# Patient Record
Sex: MALE | Race: WHITE | Employment: FULL TIME | URBAN - METROPOLITAN AREA
[De-identification: names, ages, dates, MRNs, and addresses within clinical notes are randomized per-mention and may not be internally consistent; named-entity substitution may affect disease eponyms.]

---

## 2018-08-13 ENCOUNTER — HOSPITAL ENCOUNTER (OUTPATIENT)
Dept: CT IMAGING | Age: 43
Discharge: HOME | End: 2018-08-13
Attending: ORTHOPAEDIC SURGERY
Payer: OTHER MISCELLANEOUS

## 2018-08-13 DIAGNOSIS — S82.142A FRACTURE OF LEFT TIBIAL PLATEAU: ICD-10-CM

## 2018-08-13 PROCEDURE — 73700 CT LOWER EXTREMITY W/O DYE: CPT

## 2018-08-14 NOTE — H&P
Outpatient Surgery History and Physical      Viviane Markham was seen and examined. Chief Complaint:   LEFT KNEE PAIN     Physical Exam:   There were no vitals taken for this visit. Heart:   Regular rhythm      Lungs:  Are clear      History:  No past medical history on file. No past surgical history on file. No family history on file. Social History     Occupational History    Not on file. Social History Main Topics    Smoking status: Not on file    Smokeless tobacco: Not on file    Alcohol use Not on file    Drug use: Not on file    Sexual activity: Not on file       Allergies: Reviewed per EMR  Allergies not on file    Medications:    Prior to Admission medications    Not on File        The surgery is planned for OPEN REDUCTION INTERNAL FIXATION OF LEFT TIBIAL PLATEAU          The patient is here today for outpatient surgery. I have examined the patient, no changes are noted in the patient's medical status. Necessity for the procedure/care is still present and the history and physical above is current.       Signed By: Arnulfo Ellis NP     August 14, 2018 3:26 PM

## 2018-08-15 ENCOUNTER — ANESTHESIA EVENT (OUTPATIENT)
Dept: SURGERY | Age: 43
End: 2018-08-15
Payer: OTHER MISCELLANEOUS

## 2018-08-15 ENCOUNTER — HOSPITAL ENCOUNTER (OUTPATIENT)
Age: 43
Setting detail: OBSERVATION
Discharge: HOME | End: 2018-08-16
Attending: ORTHOPAEDIC SURGERY | Admitting: ORTHOPAEDIC SURGERY
Payer: OTHER MISCELLANEOUS

## 2018-08-15 ENCOUNTER — APPOINTMENT (OUTPATIENT)
Dept: GENERAL RADIOLOGY | Age: 43
End: 2018-08-15
Attending: ORTHOPAEDIC SURGERY
Payer: OTHER MISCELLANEOUS

## 2018-08-15 ENCOUNTER — ANESTHESIA (OUTPATIENT)
Dept: SURGERY | Age: 43
End: 2018-08-15
Payer: OTHER MISCELLANEOUS

## 2018-08-15 DIAGNOSIS — S82.142A CLOSED FRACTURE OF LEFT TIBIAL PLATEAU, INITIAL ENCOUNTER: Primary | ICD-10-CM

## 2018-08-15 LAB — HGB BLD-MCNC: 15.1 G/DL (ref 13.6–17.2)

## 2018-08-15 PROCEDURE — C1713 ANCHOR/SCREW BN/BN,TIS/BN: HCPCS | Performed by: ORTHOPAEDIC SURGERY

## 2018-08-15 PROCEDURE — 99218 HC RM OBSERVATION: CPT

## 2018-08-15 PROCEDURE — 76010010054 HC POST OP PAIN BLOCK: Performed by: ORTHOPAEDIC SURGERY

## 2018-08-15 PROCEDURE — 76942 ECHO GUIDE FOR BIOPSY: CPT | Performed by: ORTHOPAEDIC SURGERY

## 2018-08-15 PROCEDURE — 74011250636 HC RX REV CODE- 250/636: Performed by: ANESTHESIOLOGY

## 2018-08-15 PROCEDURE — 77030029637: Performed by: ORTHOPAEDIC SURGERY

## 2018-08-15 PROCEDURE — 77030020255 HC SOL INJ LR 1000ML BG

## 2018-08-15 PROCEDURE — 77030016474 HC BIT DRL QC3 SYNT -C: Performed by: ORTHOPAEDIC SURGERY

## 2018-08-15 PROCEDURE — 76060000034 HC ANESTHESIA 1.5 TO 2 HR: Performed by: ORTHOPAEDIC SURGERY

## 2018-08-15 PROCEDURE — 77030020143 HC AIRWY LARYN INTUB CGAS -A: Performed by: ANESTHESIOLOGY

## 2018-08-15 PROCEDURE — 77030018836 HC SOL IRR NACL ICUM -A: Performed by: ORTHOPAEDIC SURGERY

## 2018-08-15 PROCEDURE — 77030032490 HC SLV COMPR SCD KNE COVD -B

## 2018-08-15 PROCEDURE — 74011250637 HC RX REV CODE- 250/637: Performed by: NURSE PRACTITIONER

## 2018-08-15 PROCEDURE — 74011250636 HC RX REV CODE- 250/636

## 2018-08-15 PROCEDURE — 85018 HEMOGLOBIN: CPT

## 2018-08-15 PROCEDURE — 76210000006 HC OR PH I REC 0.5 TO 1 HR: Performed by: ORTHOPAEDIC SURGERY

## 2018-08-15 PROCEDURE — 77030003051 HC NDL BN FIL DEL SYNT -B: Performed by: ORTHOPAEDIC SURGERY

## 2018-08-15 PROCEDURE — 74011250636 HC RX REV CODE- 250/636: Performed by: NURSE PRACTITIONER

## 2018-08-15 PROCEDURE — 77030003862 HC BIT DRL SYNT -B: Performed by: ORTHOPAEDIC SURGERY

## 2018-08-15 PROCEDURE — 74011000258 HC RX REV CODE- 258: Performed by: NURSE PRACTITIONER

## 2018-08-15 PROCEDURE — 77030003602 HC NDL NRV BLK BBMI -B: Performed by: ANESTHESIOLOGY

## 2018-08-15 PROCEDURE — 76010000162 HC OR TIME 1.5 TO 2 HR INTENSV-TIER 1: Performed by: ORTHOPAEDIC SURGERY

## 2018-08-15 PROCEDURE — 77030020782 HC GWN BAIR PAWS FLX 3M -B: Performed by: ANESTHESIOLOGY

## 2018-08-15 PROCEDURE — 77030021122 HC SPLNT MAT FST BSNM -A: Performed by: ORTHOPAEDIC SURGERY

## 2018-08-15 PROCEDURE — 77030002933 HC SUT MCRYL J&J -A: Performed by: ORTHOPAEDIC SURGERY

## 2018-08-15 PROCEDURE — 77030020753 HC CUF TRNQT 1BLA STRY -B: Performed by: ORTHOPAEDIC SURGERY

## 2018-08-15 PROCEDURE — 73590 X-RAY EXAM OF LOWER LEG: CPT

## 2018-08-15 DEVICE — SCREW BNE L50MM DIA3.5MM CORT S STL ST LOK FULL THRD: Type: IMPLANTABLE DEVICE | Site: TIBIA | Status: FUNCTIONAL

## 2018-08-15 DEVICE — IMPLANTABLE DEVICE: Type: IMPLANTABLE DEVICE | Site: TIBIA | Status: FUNCTIONAL

## 2018-08-15 DEVICE — SCREW BNE L75MM DIA3.5MM CORT S STL ST LOK FULL THRD T15: Type: IMPLANTABLE DEVICE | Site: TIBIA | Status: FUNCTIONAL

## 2018-08-15 DEVICE — SCREW BNE L36MM DIA3.5MM CORT S STL ST LOK FULL THRD: Type: IMPLANTABLE DEVICE | Site: TIBIA | Status: FUNCTIONAL

## 2018-08-15 DEVICE — SCREW BNE L28MM DIA3.5MM CORT S STL ST LOK FULL THRD: Type: IMPLANTABLE DEVICE | Site: TIBIA | Status: FUNCTIONAL

## 2018-08-15 RX ORDER — FENTANYL CITRATE 50 UG/ML
100 INJECTION, SOLUTION INTRAMUSCULAR; INTRAVENOUS ONCE
Status: COMPLETED | OUTPATIENT
Start: 2018-08-15 | End: 2018-08-15

## 2018-08-15 RX ORDER — ACETAMINOPHEN 10 MG/ML
INJECTION, SOLUTION INTRAVENOUS AS NEEDED
Status: DISCONTINUED | OUTPATIENT
Start: 2018-08-15 | End: 2018-08-15 | Stop reason: HOSPADM

## 2018-08-15 RX ORDER — OXYCODONE HYDROCHLORIDE 5 MG/1
5 TABLET ORAL
Status: DISCONTINUED | OUTPATIENT
Start: 2018-08-15 | End: 2018-08-15 | Stop reason: HOSPADM

## 2018-08-15 RX ORDER — ONDANSETRON 2 MG/ML
INJECTION INTRAMUSCULAR; INTRAVENOUS AS NEEDED
Status: DISCONTINUED | OUTPATIENT
Start: 2018-08-15 | End: 2018-08-15 | Stop reason: HOSPADM

## 2018-08-15 RX ORDER — MIDAZOLAM HYDROCHLORIDE 1 MG/ML
2 INJECTION, SOLUTION INTRAMUSCULAR; INTRAVENOUS ONCE
Status: COMPLETED | OUTPATIENT
Start: 2018-08-15 | End: 2018-08-15

## 2018-08-15 RX ORDER — DEXAMETHASONE SODIUM PHOSPHATE 4 MG/ML
INJECTION, SOLUTION INTRA-ARTICULAR; INTRALESIONAL; INTRAMUSCULAR; INTRAVENOUS; SOFT TISSUE AS NEEDED
Status: DISCONTINUED | OUTPATIENT
Start: 2018-08-15 | End: 2018-08-15 | Stop reason: HOSPADM

## 2018-08-15 RX ORDER — ACETAMINOPHEN 325 MG/1
650 TABLET ORAL EVERY 8 HOURS
Status: DISCONTINUED | OUTPATIENT
Start: 2018-08-15 | End: 2018-08-16 | Stop reason: HOSPADM

## 2018-08-15 RX ORDER — TRAMADOL HYDROCHLORIDE 50 MG/1
50 TABLET ORAL
Status: DISCONTINUED | OUTPATIENT
Start: 2018-08-15 | End: 2018-08-16 | Stop reason: HOSPADM

## 2018-08-15 RX ORDER — MAG HYDROX/ALUMINUM HYD/SIMETH 200-200-20
30 SUSPENSION, ORAL (FINAL DOSE FORM) ORAL
Status: DISCONTINUED | OUTPATIENT
Start: 2018-08-15 | End: 2018-08-16 | Stop reason: HOSPADM

## 2018-08-15 RX ORDER — HYDROMORPHONE HYDROCHLORIDE 2 MG/ML
1-2 INJECTION, SOLUTION INTRAMUSCULAR; INTRAVENOUS; SUBCUTANEOUS
Status: DISCONTINUED | OUTPATIENT
Start: 2018-08-15 | End: 2018-08-16

## 2018-08-15 RX ORDER — HYDROMORPHONE HYDROCHLORIDE 2 MG/ML
0.5 INJECTION, SOLUTION INTRAMUSCULAR; INTRAVENOUS; SUBCUTANEOUS
Status: DISCONTINUED | OUTPATIENT
Start: 2018-08-15 | End: 2018-08-15 | Stop reason: HOSPADM

## 2018-08-15 RX ORDER — SODIUM CHLORIDE, SODIUM LACTATE, POTASSIUM CHLORIDE, CALCIUM CHLORIDE 600; 310; 30; 20 MG/100ML; MG/100ML; MG/100ML; MG/100ML
75 INJECTION, SOLUTION INTRAVENOUS CONTINUOUS
Status: DISCONTINUED | OUTPATIENT
Start: 2018-08-15 | End: 2018-08-15 | Stop reason: HOSPADM

## 2018-08-15 RX ORDER — LIDOCAINE HYDROCHLORIDE 20 MG/ML
INJECTION, SOLUTION EPIDURAL; INFILTRATION; INTRACAUDAL; PERINEURAL AS NEEDED
Status: DISCONTINUED | OUTPATIENT
Start: 2018-08-15 | End: 2018-08-15 | Stop reason: HOSPADM

## 2018-08-15 RX ORDER — SODIUM CHLORIDE 0.9 % (FLUSH) 0.9 %
5-10 SYRINGE (ML) INJECTION EVERY 8 HOURS
Status: DISCONTINUED | OUTPATIENT
Start: 2018-08-15 | End: 2018-08-16 | Stop reason: HOSPADM

## 2018-08-15 RX ORDER — SODIUM CHLORIDE 0.9 % (FLUSH) 0.9 %
5-10 SYRINGE (ML) INJECTION AS NEEDED
Status: DISCONTINUED | OUTPATIENT
Start: 2018-08-15 | End: 2018-08-15 | Stop reason: HOSPADM

## 2018-08-15 RX ORDER — FENTANYL CITRATE 50 UG/ML
INJECTION, SOLUTION INTRAMUSCULAR; INTRAVENOUS AS NEEDED
Status: DISCONTINUED | OUTPATIENT
Start: 2018-08-15 | End: 2018-08-15 | Stop reason: HOSPADM

## 2018-08-15 RX ORDER — OXYCODONE HYDROCHLORIDE 5 MG/1
5-10 TABLET ORAL
Status: DISCONTINUED | OUTPATIENT
Start: 2018-08-15 | End: 2018-08-16 | Stop reason: HOSPADM

## 2018-08-15 RX ORDER — ONDANSETRON 2 MG/ML
4 INJECTION INTRAMUSCULAR; INTRAVENOUS
Status: DISCONTINUED | OUTPATIENT
Start: 2018-08-15 | End: 2018-08-16 | Stop reason: HOSPADM

## 2018-08-15 RX ORDER — SODIUM CHLORIDE, SODIUM LACTATE, POTASSIUM CHLORIDE, CALCIUM CHLORIDE 600; 310; 30; 20 MG/100ML; MG/100ML; MG/100ML; MG/100ML
75 INJECTION, SOLUTION INTRAVENOUS
Status: DISPENSED | OUTPATIENT
Start: 2018-08-15 | End: 2018-08-16

## 2018-08-15 RX ORDER — PROPOFOL 10 MG/ML
INJECTION, EMULSION INTRAVENOUS AS NEEDED
Status: DISCONTINUED | OUTPATIENT
Start: 2018-08-15 | End: 2018-08-15 | Stop reason: HOSPADM

## 2018-08-15 RX ORDER — SODIUM CHLORIDE 0.9 % (FLUSH) 0.9 %
5-10 SYRINGE (ML) INJECTION AS NEEDED
Status: DISCONTINUED | OUTPATIENT
Start: 2018-08-15 | End: 2018-08-16 | Stop reason: HOSPADM

## 2018-08-15 RX ORDER — CEFAZOLIN SODIUM/WATER 2 G/20 ML
2 SYRINGE (ML) INTRAVENOUS
Status: COMPLETED | OUTPATIENT
Start: 2018-08-15 | End: 2018-08-15

## 2018-08-15 RX ORDER — OXYCODONE AND ACETAMINOPHEN 10; 325 MG/1; MG/1
1 TABLET ORAL AS NEEDED
Status: DISCONTINUED | OUTPATIENT
Start: 2018-08-15 | End: 2018-08-15 | Stop reason: HOSPADM

## 2018-08-15 RX ORDER — ENOXAPARIN SODIUM 100 MG/ML
30 INJECTION SUBCUTANEOUS EVERY 24 HOURS
Status: DISCONTINUED | OUTPATIENT
Start: 2018-08-16 | End: 2018-08-16 | Stop reason: HOSPADM

## 2018-08-15 RX ORDER — SODIUM CHLORIDE, SODIUM LACTATE, POTASSIUM CHLORIDE, CALCIUM CHLORIDE 600; 310; 30; 20 MG/100ML; MG/100ML; MG/100ML; MG/100ML
75 INJECTION, SOLUTION INTRAVENOUS CONTINUOUS
Status: DISCONTINUED | OUTPATIENT
Start: 2018-08-15 | End: 2018-08-16 | Stop reason: HOSPADM

## 2018-08-15 RX ADMIN — OXYCODONE HYDROCHLORIDE 10 MG: 5 TABLET ORAL at 23:54

## 2018-08-15 RX ADMIN — HYDROMORPHONE HYDROCHLORIDE 0.5 MG: 2 INJECTION, SOLUTION INTRAMUSCULAR; INTRAVENOUS; SUBCUTANEOUS at 18:15

## 2018-08-15 RX ADMIN — MIDAZOLAM HYDROCHLORIDE 2 MG: 1 INJECTION, SOLUTION INTRAMUSCULAR; INTRAVENOUS at 14:47

## 2018-08-15 RX ADMIN — FENTANYL CITRATE 25 MCG: 50 INJECTION, SOLUTION INTRAMUSCULAR; INTRAVENOUS at 17:39

## 2018-08-15 RX ADMIN — SODIUM CHLORIDE, SODIUM LACTATE, POTASSIUM CHLORIDE, AND CALCIUM CHLORIDE 75 ML/HR: 600; 310; 30; 20 INJECTION, SOLUTION INTRAVENOUS at 20:15

## 2018-08-15 RX ADMIN — CEFAZOLIN 1 G: 1 INJECTION, POWDER, FOR SOLUTION INTRAMUSCULAR; INTRAVENOUS; PARENTERAL at 23:54

## 2018-08-15 RX ADMIN — HYDROMORPHONE HYDROCHLORIDE 0.5 MG: 2 INJECTION, SOLUTION INTRAMUSCULAR; INTRAVENOUS; SUBCUTANEOUS at 18:07

## 2018-08-15 RX ADMIN — ACETAMINOPHEN 650 MG: 325 TABLET, FILM COATED ORAL at 20:06

## 2018-08-15 RX ADMIN — PROPOFOL 30 MG: 10 INJECTION, EMULSION INTRAVENOUS at 17:01

## 2018-08-15 RX ADMIN — DEXAMETHASONE SODIUM PHOSPHATE 4 MG: 4 INJECTION, SOLUTION INTRA-ARTICULAR; INTRALESIONAL; INTRAMUSCULAR; INTRAVENOUS; SOFT TISSUE at 16:30

## 2018-08-15 RX ADMIN — FENTANYL CITRATE 25 MCG: 50 INJECTION, SOLUTION INTRAMUSCULAR; INTRAVENOUS at 17:38

## 2018-08-15 RX ADMIN — Medication 2 G: at 16:23

## 2018-08-15 RX ADMIN — FENTANYL CITRATE 25 MCG: 50 INJECTION, SOLUTION INTRAMUSCULAR; INTRAVENOUS at 16:39

## 2018-08-15 RX ADMIN — HYDROMORPHONE HYDROCHLORIDE 1 MG: 2 INJECTION, SOLUTION INTRAMUSCULAR; INTRAVENOUS; SUBCUTANEOUS at 21:41

## 2018-08-15 RX ADMIN — ONDANSETRON 4 MG: 2 INJECTION INTRAMUSCULAR; INTRAVENOUS at 16:31

## 2018-08-15 RX ADMIN — PROPOFOL 100 MG: 10 INJECTION, EMULSION INTRAVENOUS at 16:19

## 2018-08-15 RX ADMIN — SODIUM CHLORIDE, SODIUM LACTATE, POTASSIUM CHLORIDE, AND CALCIUM CHLORIDE 75 ML/HR: 600; 310; 30; 20 INJECTION, SOLUTION INTRAVENOUS at 14:24

## 2018-08-15 RX ADMIN — FENTANYL CITRATE 75 MCG: 50 INJECTION, SOLUTION INTRAMUSCULAR; INTRAVENOUS at 16:41

## 2018-08-15 RX ADMIN — LIDOCAINE HYDROCHLORIDE 100 MG: 20 INJECTION, SOLUTION EPIDURAL; INFILTRATION; INTRACAUDAL; PERINEURAL at 16:17

## 2018-08-15 RX ADMIN — ACETAMINOPHEN 1000 MG: 10 INJECTION, SOLUTION INTRAVENOUS at 17:27

## 2018-08-15 RX ADMIN — PROPOFOL 200 MG: 10 INJECTION, EMULSION INTRAVENOUS at 16:17

## 2018-08-15 RX ADMIN — OXYCODONE HYDROCHLORIDE 5 MG: 5 TABLET ORAL at 20:07

## 2018-08-15 RX ADMIN — FENTANYL CITRATE 100 MCG: 50 INJECTION INTRAMUSCULAR; INTRAVENOUS at 14:47

## 2018-08-15 RX ADMIN — FENTANYL CITRATE 50 MCG: 50 INJECTION, SOLUTION INTRAMUSCULAR; INTRAVENOUS at 17:03

## 2018-08-15 NOTE — ANESTHESIA PREPROCEDURE EVALUATION
Anesthetic History   No history of anesthetic complications            Review of Systems / Medical History  Patient summary reviewed and pertinent labs reviewed    Pulmonary  Within defined limits                 Neuro/Psych   Within defined limits          Comments: anxiety Cardiovascular                  Exercise tolerance: >4 METS     GI/Hepatic/Renal  Within defined limits              Endo/Other        Obesity     Other Findings            Physical Exam    Airway  Mallampati: II  TM Distance: > 6 cm  Neck ROM: normal range of motion   Mouth opening: Normal     Cardiovascular    Rhythm: regular           Dental  No notable dental hx       Pulmonary                 Abdominal  GI exam deferred       Other Findings            Anesthetic Plan    ASA: 2  Anesthesia type: general - femoral single shot      Post-op pain plan if not by surgeon: peripheral nerve block single    Induction: Intravenous  Anesthetic plan and risks discussed with: Patient

## 2018-08-15 NOTE — IP AVS SNAPSHOT
[address] 
[phone] Patient: Jose Elias Barrientos MRN: [id number] :1975 About your hospitalization You were admitted on:  August 15, 2018 You last received care in the:  [facility] 7 MED SURG You were discharged on:  2018 Why you were hospitalized Your primary diagnosis was:  Not on File Your diagnoses also included:  Fracture Of Left Tibial Plateau Follow-up Information Follow up With Details Comments Contact Info Jojo Panda MD On 2018 9:40 a.m. [address] [facility] 
[address] [facility] [address] [facility] [address] None   None (395) Patient stated that they have no PCP Discharge Orders Procedure Order Date Status Priority Quantity Spec Type Associated Dx CALL YOUR DOCTOR For: Severe uncontrolled pain. , Redness, tenderness, or signs of infection. 18 1235 Normal Routine 1  Closed fracture of left tibial plateau, initial encounter [[id number]] Questions: For:  Severe uncontrolled pain. For:  Redness, tenderness, or signs of infection. ACTIVITY AFTER DISCHARGE Patient should: Restrict weight bearing 18 1235 Normal Routine 1  Closed fracture of left tibial plateau, initial encounter [[id number]] Questions: Patient should:  Restrict weight bearing DIET REGULAR No added salt 18 1235 Normal Routine 1  Closed fracture of left tibial plateau, initial encounter [[id number]] Questions: Additional options:  No added salt DRESSING, DO NOT REMOVE 18 1235 Normal Routine 1  Closed fracture of left tibial plateau, initial encounter [[id number]] Comments:  Keep clean, dry and intact until next clinic visit. A check tammie indicates which time of day the medication should be taken. My Medications START taking these medications  Instructions Each Dose to Equal  
 Morning Noon Evening Bedtime  
 enoxaparin 30 mg/0.3 mL injection Commonly known as:  LOVENOX Start taking on:  8/17/2018 Your next dose is:  Tomorrow Morning 
  
   
 0.3 mL by SubCUTAneous route every twenty-four (24) hours. 30 mg  
    
  
   
   
   
  
 oxyCODONE IR 5 mg immediate release tablet Commonly known as:  Kenzie Camara Your last dose was:  8/16/18 12:04 p.m. Your next dose is:  4:04 p.m. Take 1-2 Tabs by mouth every four (4) hours as needed. Max Daily Amount: 60 mg.  
 5-10 mg CONTINUE taking these medications Instructions Each Dose to Equal  
 Morning Noon Evening Bedtime KEFLEX 500 mg capsule Generic drug:  cephALEXin Your next dose is:  TODAY, evening and bedtime Take 500 mg by mouth four (4) times daily. Taking for UTI  
 500 mg  
    
  
   
  
   
  
   
  
  
 traMADol 50 mg tablet Commonly known as:  ULTRAM  
Your next dose is: Take on as needed schedule Take 50 mg by mouth every six (6) hours as needed for Pain. 50 mg ASK your doctor about these medications Instructions Each Dose to Equal  
 Morning Noon Evening Bedtime  
 ibuprofen 200 mg tablet Commonly known as:  MOTRIN Your next dose is: Take on as needed schedule Take 200 mg by mouth every six (6) hours as needed for Pain. 200 mg Where to Get Your Medications Information on where to get these meds will be given to you by the nurse or doctor. ! Ask your nurse or doctor about these medications  
  enoxaparin 30 mg/0.3 mL injection  
 oxyCODONE IR 5 mg immediate release tablet Opioid Education Prescription Opioids: What You Need to Know: 
 
Prescription opioids can be used to help relieve moderate-to-severe pain and are often prescribed following a surgery or injury, or for certain health conditions.   These medications can be an important part of treatment but also come with serious risks. Opioids are strong pain medicines. Examples include hydrocodone, oxycodone, fentanyl, and morphine. Heroin is an example of an illegal opioid. It is important to work with your health care provider to make sure you are getting the safest, most effective care. WHAT ARE THE RISKS AND SIDE EFFECTS OF OPIOID USE? Prescription opioids carry serious risks of addiction and overdose, especially with prolonged use. An opioid overdose, often marked by slow breathing, can cause sudden death. The use of prescription opioids can have a number of side effects as well, even when taken as directed. · Tolerance-meaning you might need to take more of a medication for the same pain relief · Physical dependence-meaning you have symptoms of withdrawal when the medication is stopped. Withdrawal symptoms can include nausea, sweating, chills, diarrhea, stomach cramps, and muscle aches. Withdrawal can last up to several weeks, depending on which drug you took and how long you took it. · Increased sensitivity to pain · Constipation · Nausea, vomiting, and dry mouth · Sleepiness and dizziness · Confusion · Depression · Low levels of testosterone that can result in lower sex drive, energy, and strength · Itching and sweating RISKS ARE GREATER WITH:      
· History of drug misuse, substance use disorder, or overdose · Mental health conditions (such as depression or anxiety) · Sleep apnea · Older age (72 years or older) · Pregnancy Avoid alcohol while taking prescription opioids. Also, unless specifically advised by your health care provider, medications to avoid include: · Benzodiazepines (such as Xanax or Valium) · Muscle relaxants (such as Soma or Flexeril) · Hypnotics (such as Ambien or Lunesta) · Other prescription opioids KNOW YOUR OPTIONS Talk to your health care provider about ways to manage your pain that don't involve prescription opioids. Some of these options may actually work better and have fewer risks and side effects. Options may include: 
· Pain relievers such as acetaminophen, ibuprofen, and naproxen · Some medications that are also used for depression or seizures · Physical therapy and exercise · Counseling to help patients learn how to cope better with triggers of pain and stress. · Application of heat or cold compress · Massage therapy · Relaxation techniques Be Informed Make sure you know the name of your medication, how much and how often to take it, and its potential risks & side effects. IF YOU ARE PRESCRIBED OPIOIDS FOR PAIN: 
· Never take opioids in greater amounts or more often than prescribed. Remember the goal is not to be pain-free but to manage your pain at a tolerable level. · Follow up with your primary care provider to: · Work together to create a plan on how to manage your pain. · Talk about ways to help manage your pain that don't involve prescription opioids. · Talk about any and all concerns and side effects. · Help prevent misuse and abuse. · Never sell or share prescription opioids · Help prevent misuse and abuse. · Store prescription opioids in a secure place and out of reach of others (this may include visitors, children, friends, and family). · Safely dispose of unused/unwanted prescription opioids: Find your community drug take-back program or your pharmacy mail-back program, or flush them down the toilet, following guidance from the Food and Drug Administration (www.fda.gov/Drugs/ResourcesForYou). · Visit www.cdc.gov/drugoverdose to learn about the risks of opioid abuse and overdose. · If you believe you may be struggling with addiction, tell your health care provider and ask for guidance or call [address] [place], [place] [address] at 4-397-736-UABO. Discharge Instructions · NON-WEIGHT BEARING LEFT LEG 
· ELEVATE LEFT LEG 
· KEEP DRESSING CLEAN AND DRY · HINGED BRACE LEFT LEG - UNLOCKED IN BED OR CHAIR; LOCKED AT 30 DEGREES WHILE AMBULATING 
· CPM WHILE IN HINGED BRACE - 0-45 DEGREES AT 2 CYCLES PER MIN, 4 HOURS IN AM AND 4 HOURS IN PM 
· FOLLOW-UP APPT WITH DR Rogelio Chapa - 8/24/18  @ 9:40 AM 
· PRESCRIPTION FOR OXYCODONE 5 MG GIVEN TO PATIENT · LOVENOX 40 MG CALLED IN TO [facility] 
 
DISCHARGE SUMMARY from Nurse PATIENT INSTRUCTIONS: 
 
 
F-face looks uneven A-arms unable to move or move unevenly S-speech slurred or non-existent T-time-call 911 as soon as signs and symptoms begin-DO NOT go Back to bed or wait to see if you get better-TIME IS BRAIN. Warning Signs of HEART ATTACK Call 911 if you have these symptoms: 
? Chest discomfort. Most heart attacks involve discomfort in the center of the chest that lasts more than a few minutes, or that goes away and comes back. It can feel like uncomfortable pressure, squeezing, fullness, or pain. ? Discomfort in other areas of the upper body. Symptoms can include pain or discomfort in one or both arms, the back, neck, jaw, or stomach. ? Shortness of breath with or without chest discomfort. ? Other signs may include breaking out in a cold sweat, nausea, or lightheadedness. Don't wait more than five minutes to call [address]! Fast action can save your life. Calling 911 is almost always the fastest way to get lifesaving treatment. Emergency Medical Services staff can begin treatment when they arrive  up to an hour sooner than if someone gets to the hospital by car. The discharge information has been reviewed with the patient. The patient verbalized understanding.  
Discharge medications reviewed with the patient and appropriate educational materials and side effects teaching were provided. ___________________________________________________________________________________________________________________________________ Introducing [facility]! Tracy Stanford introduces Tunespeak patient portal. Now you can access parts of your medical record, email your doctor's office, and request medication refills online. 1. In your internet browser, go to [email or web address] 2. Click on the First Time User? Click Here link in the Sign In box. You will see the New Member Sign Up page. 3. Enter your Tunespeak Access Code exactly as it appears below. You will not need to use this code after youve completed the sign-up process. If you do not sign up before the expiration date, you must request a new code. · Tunespeak Access Code: [email or web address] Expires: 11/8/2018  3:21 PM 
 
4. Enter the last four digits of your Social Security Number (xxxx) and Date of Birth (mm/dd/yyyy) as indicated and click Submit. You will be taken to the next sign-up page. 5. Create a Tucker Blairt ID. This will be your Tunespeak login ID and cannot be changed, so think of one that is secure and easy to remember. 6. Create a Tunespeak password. You can change your password at any time. 7. Enter your Password Reset Question and Answer. This can be used at a later time if you forget your password. 8. Enter your e-mail address. You will receive e-mail notification when new information is available in [address] 9. Click Sign Up. You can now view and download portions of your medical record. 10. Click the Download Summary menu link to download a portable copy of your medical information. If you have questions, please visit the Frequently Asked Questions section of the Tunespeak website. Remember, Tunespeak is NOT to be used for urgent needs. For medical emergencies, dial 911. Now available from your iPhone and Android! Introducing Stevenson Burris As a [facility] patient, I wanted to make you aware of our electronic visit tool called Stevenson Burris. [facility] 24/7 allows you to connect within minutes with a medical provider 24 hours a day, seven days a week via a mobile device or tablet or logging into a secure website from your computer. You can access Stevenson Burris from anywhere in the [place]. A virtual visit might be right for you when you have a simple condition and feel like you just dont want to get out of bed, or cant get away from work for an appointment, when your regular [facility] provider is not available (evenings, weekends or holidays), or when youre out of town and need minor care. Electronic visits cost only $49 and if the [facility] 24/7 provider determines a prescription is needed to treat your condition, one can be electronically transmitted to a nearby pharmacy*. Please take a moment to enroll today if you have not already done so. The enrollment process is free and takes just a few minutes. To enroll, please download the [facility] 24/7 amber to your tablet or phone, or visit [email or web address] to enroll on your computer. And, as an [address] [place] [address] patient with a [employer] account, the results of your visits will be scanned into your electronic medical record and your primary care provider will be able to view the scanned results. We urge you to continue to see your regular [facility] provider for your ongoing medical care. And while your primary care provider may not be the one available when you seek a Stevenson Burris virtual visit, the peace of mind you get from getting a real diagnosis real time can be priceless. For more information on Stevenson Burris, view our Frequently Asked Questions (FAQs) at [email or web address]. Sincerely, 
 
Shu Martinez MD 
Chief Medical Officer Lorena  *:  certain medications cannot be prescribed via Stevenson Burris Unresulted Labs-Please follow up with your PCP about these lab tests Order Current Status NC XR TECHNOLOGIST SERVICE In process Providers Seen During Your Hospitalization Provider Specialty Primary office phone Dolores Ramsey MD Orthopedic Surgery [phone] Your Primary Care Physician (PCP) Primary Care Physician Office Phone Office Fax NONE ** None ** ** None ** You are allergic to the following No active allergies Recent Documentation Height Weight BMI Smoking Status 1.803 m 113.4 kg 34.85 kg/m2 Never Smoker Emergency Contacts Name Discharge Info Relation Home Work Mobile Cindy Meadows DISCHARGE CAREGIVER [3] Spouse [3]   [phone] Patient Belongings The following personal items are in your possession at time of discharge: 
  Dental Appliances: None         Home Medications: None   Jewelry: None  Clothing: Shirt, Footwear, Shorts, Undergarments    Other Valuables: Crutches, Other (comment) (leg brace) Please provide this summary of care documentation to your next provider. Signatures-by signing, you are acknowledging that this After Visit Summary has been reviewed with you and you have received a copy. Patient Signature:  ____________________________________________________________ Date:  ____________________________________________________________  
  
Owen Canela Provider Signature:  ____________________________________________________________ Date:  ____________________________________________________________

## 2018-08-15 NOTE — ANESTHESIA POSTPROCEDURE EVALUATION
Post-Anesthesia Evaluation and Assessment    Patient: Haylee Valdes MRN: [id number]  SSN: [id number]    YOB: 1975  Age: 41 y.o. Sex: male       Cardiovascular Function/Vital Signs  Visit Vitals    /69 (BP 1 Location: Left arm, BP Patient Position: At rest)    Pulse 90    Temp 36.9 °C (98.4 °F)    Resp 14    Ht 5' 11\" (1.803 m)    Wt 113.4 kg (249 lb 14.4 oz)    SpO2 96%    BMI 34.85 kg/m2       Patient is status post general, regional anesthesia for Procedure(s):  LEFT LATERAL TIBIAL PLATEAU OPEN REDUCTION INTERNAL FIXATION  CHOICE ANES. Nausea/Vomiting: None    Postoperative hydration reviewed and adequate. Pain:  Pain Scale 1: Numeric (0 - 10) (08/15/18 1826)  Pain Intensity 1: 5 (08/15/18 1826)   Managed    Neurological Status:   Neuro (WDL): Exceptions to WDL (08/15/18 1826)  Neuro  Neurologic State: Alert (08/15/18 1826)  Orientation Level: Oriented X4 (08/15/18 1826)  Cognition: Appropriate decision making; Appropriate for age attention/concentration; Appropriate safety awareness; Follows commands (08/15/18 1826)  Speech: Clear (08/15/18 1826)  LUE Motor Response: Purposeful (08/15/18 1826)  LLE Motor Response: Purposeful (08/15/18 1826)  RUE Motor Response: Purposeful (08/15/18 1826)  RLE Motor Response: Purposeful (08/15/18 1826)   At baseline    Mental Status and Level of Consciousness: Arousable    Pulmonary Status:   O2 Device: Nasal cannula (08/15/18 1826)   Adequate oxygenation and airway patent    Complications related to anesthesia: None    Post-anesthesia assessment completed.  No concerns    Signed By: Uziel Younger MD     August 15, 2018

## 2018-08-15 NOTE — PERIOP NOTES
TRANSFER - OUT REPORT:    Verbal report given to KARIME Curiel(name) on [employer]  being transferred to [facility](unit) for routine post - op       Report consisted of patients Situation, Background, Assessment and   Recommendations(SBAR). Information from the following report(s) Kardex, OR Summary, Procedure Summary, Intake/Output and MAR was reviewed with the receiving nurse. Lines:   Peripheral IV 08/15/18 Right Hand (Active)   Site Assessment Clean, dry, & intact 8/15/2018  5:52 PM   Phlebitis Assessment 0 8/15/2018  5:52 PM   Infiltration Assessment 0 8/15/2018  5:52 PM   Dressing Status Clean, dry, & intact 8/15/2018  5:52 PM   Dressing Type Transparent;Tape 8/15/2018  5:52 PM   Hub Color/Line Status Infusing;Pink 8/15/2018  5:52 PM   Alcohol Cap Used No 8/15/2018  5:52 PM        Opportunity for questions and clarification was provided. Patient transported with:   O2 @ 2 liters        Patient and family given floor number and nurses name. Family updated re: pt status after security code verified.

## 2018-08-15 NOTE — ANESTHESIA PROCEDURE NOTES
Peripheral Block    Start time: 8/15/2018 2:47 PM  End time: 8/15/2018 2:50 PM  Performed by: Irina Cary by: Kimberly Ita: Indications: at surgeon's request and post-op pain management    Preanesthetic Checklist: patient identified, risks and benefits discussed, site marked, timeout performed, anesthesia consent given and patient being monitored    Timeout Time: 14:47          Block Type:   Block Type:   Adductor canal  Laterality:  Left  Monitoring:  Standard ASA monitoring, continuous pulse ox, frequent vital sign checks, heart rate, oxygen and responsive to questions  Injection Technique:  Single shot  Procedures: ultrasound guided    Patient Position: supine  Prep: chlorhexidine    Location:  Mid thigh  Needle Type:  Stimuplex  Needle Gauge:  20 G  Needle Localization:  Anatomical landmarks and ultrasound guidance  Medication Injected:  0.5%  ropivacaine  Volume (mL):  20    Assessment:  Number of attempts:  1  Injection Assessment:  Incremental injection every 5 mL, local visualized surrounding nerve on ultrasound, negative aspiration for blood, no intravascular symptoms, no paresthesia and ultrasound image on chart  Patient tolerance:  Patient tolerated the procedure well with no immediate complications

## 2018-08-16 VITALS
OXYGEN SATURATION: 95 % | HEIGHT: 71 IN | WEIGHT: 249.9 LBS | SYSTOLIC BLOOD PRESSURE: 149 MMHG | HEART RATE: 80 BPM | TEMPERATURE: 98.6 F | DIASTOLIC BLOOD PRESSURE: 87 MMHG | RESPIRATION RATE: 18 BRPM | BODY MASS INDEX: 34.98 KG/M2

## 2018-08-16 PROCEDURE — 97116 GAIT TRAINING THERAPY: CPT

## 2018-08-16 PROCEDURE — 74011000258 HC RX REV CODE- 258: Performed by: NURSE PRACTITIONER

## 2018-08-16 PROCEDURE — 74011250636 HC RX REV CODE- 250/636: Performed by: NURSE PRACTITIONER

## 2018-08-16 PROCEDURE — 74011250637 HC RX REV CODE- 250/637: Performed by: NURSE PRACTITIONER

## 2018-08-16 PROCEDURE — 77030020255 HC SOL INJ LR 1000ML BG

## 2018-08-16 PROCEDURE — 97530 THERAPEUTIC ACTIVITIES: CPT

## 2018-08-16 PROCEDURE — 99218 HC RM OBSERVATION: CPT

## 2018-08-16 PROCEDURE — 97161 PT EVAL LOW COMPLEX 20 MIN: CPT

## 2018-08-16 RX ORDER — ENOXAPARIN SODIUM 100 MG/ML
30 INJECTION SUBCUTANEOUS EVERY 24 HOURS
Qty: 28 SYRINGE | Refills: 0 | Status: SHIPPED
Start: 2018-08-17

## 2018-08-16 RX ORDER — HYDROMORPHONE HYDROCHLORIDE 2 MG/ML
0.5 INJECTION, SOLUTION INTRAMUSCULAR; INTRAVENOUS; SUBCUTANEOUS
Status: DISCONTINUED | OUTPATIENT
Start: 2018-08-16 | End: 2018-08-16 | Stop reason: HOSPADM

## 2018-08-16 RX ORDER — OXYCODONE HYDROCHLORIDE 5 MG/1
5-10 TABLET ORAL
Qty: 40 TAB | Refills: 0 | Status: SHIPPED
Start: 2018-08-16

## 2018-08-16 RX ADMIN — ACETAMINOPHEN 650 MG: 325 TABLET, FILM COATED ORAL at 13:11

## 2018-08-16 RX ADMIN — ENOXAPARIN SODIUM 30 MG: 100 INJECTION SUBCUTANEOUS at 08:20

## 2018-08-16 RX ADMIN — HYDROMORPHONE HYDROCHLORIDE 1 MG: 2 INJECTION, SOLUTION INTRAMUSCULAR; INTRAVENOUS; SUBCUTANEOUS at 01:33

## 2018-08-16 RX ADMIN — Medication 10 ML: at 13:13

## 2018-08-16 RX ADMIN — CEFAZOLIN 1 G: 1 INJECTION, POWDER, FOR SOLUTION INTRAMUSCULAR; INTRAVENOUS; PARENTERAL at 08:45

## 2018-08-16 RX ADMIN — ACETAMINOPHEN 650 MG: 325 TABLET, FILM COATED ORAL at 05:36

## 2018-08-16 RX ADMIN — ONDANSETRON 4 MG: 2 INJECTION INTRAMUSCULAR; INTRAVENOUS at 00:52

## 2018-08-16 RX ADMIN — ONDANSETRON 4 MG: 2 INJECTION INTRAMUSCULAR; INTRAVENOUS at 08:20

## 2018-08-16 RX ADMIN — TRAMADOL HYDROCHLORIDE 50 MG: 50 TABLET, FILM COATED ORAL at 08:20

## 2018-08-16 RX ADMIN — OXYCODONE HYDROCHLORIDE 5 MG: 5 TABLET ORAL at 12:04

## 2018-08-16 RX ADMIN — TRAMADOL HYDROCHLORIDE 50 MG: 50 TABLET, FILM COATED ORAL at 15:10

## 2018-08-16 NOTE — PROGRESS NOTES
Discharge instructions and prescriptions given and explained to pt. Pt verbalized understanding. Medication side effects sheet reviewed with pt. Pt to be discharged home after working with PT this afternoon.  Per Tiago House, NP CPM will be delivered to pt's home and his workers comp  will arrange outpatient PT.

## 2018-08-16 NOTE — PROGRESS NOTES
08/15/18 1919   Dual Skin Pressure Injury Assessment   Dual Skin Pressure Injury Assessment WDL   Second Care Provider (Based on Facility Policy) Marie Vela RN   Skin Integumentary   Skin Integumentary (WDL) X   Skin Color Appropriate for ethnicity   Skin Condition/Temp Warm;Dry   Skin Integrity Incision (comment)  (LLE)   Hair Growth Present   Varicosities Absent   Wound Prevention and Protection Methods   Orientation of Wound Prevention Posterior   Location of Wound Prevention Sacrum/Coccyx   Dressing Present  No   Wound Offloading (Prevention Methods) Bed, pressure redistribution/air;Bed, pressure reduction mattress;Pillows;Repositioning;Turning   Dressing intact to LLE with brace in place.

## 2018-08-16 NOTE — PROGRESS NOTES
Order and pertinent paperwork faxed to [facility] for rolling walker to be delivered to room prior to discharge.

## 2018-08-16 NOTE — PROGRESS NOTES
Problem: Mobility Impaired (Adult and Pediatric)  Goal: *Acute Goals and Plan of Care (Insert Text)  STG:  (1.)Mr. Jairo Rojas will move from supine to sit and sit to supine , scoot up and down and roll side to side with SUPERVISION within 3 treatment day(s). (2.)Mr. Jairo Rojas will transfer from bed to chair and chair to bed with STAND BY ASSIST using the least restrictive device within 3 treatment day(s). (3.)Mr. Jairo Rojas will ambulate with STAND BY ASSIST for 50+ feet with the least restrictive device within 3 treatment day(s). (4.)Mr. Jairo Rojas will maintain NWB LLE precautions throughout all functional mobility within 3 days with 0 verbal cues. (5.)Mr. Jairo Rojas will perform standing static and dynamic balance activities x 15 minutes with STAND BY ASSIST to improve safety within 3 day(s). LTG:  (1.)Mr. Jairo Rojas will move from supine to sit and sit to supine , scoot up and down and roll side to side in bed with MODIFIED INDEPENDENCE within 7 treatment day(s). (2.)Mr. Jairo Rojas will transfer from bed to chair and chair to bed with MODIFIED INDEPENDENCE using the least restrictive device within 7 treatment day(s). (3.)Mr. Jairo Rojas will ambulate with MODIFIED INDEPENDENCE for 150+ feet with the least restrictive device within 7 treatment day(s). (4.)Mr. Jairo Rojas will perform standing static and dynamic balance activities x 15 minutes with MODIFIED INDEPENDENCE to improve safety within 7 day(s). (5.)Mr. Jairo Rojas will ascend and descend 3 stairs using 0 hand rail(s) with SUPERVISION to improve functional mobility and safety within 7 day(s). ________________________________________________________________________________________________      PHYSICAL THERAPY: Initial Assessment, Daily Note, AM 8/16/2018  OBSERVATION: Hospital Day: 2  Payor: [employer] / Plan: [id number] [facility] / Product Type:  Workers Comp /    NWB LLE; CPM 0-45 degrees, 2 cycles per minute, brace locked at 30 degrees during ambulation, unlocked in bed and chair  NAME/AGE/GENDER: Ayanna Gamino is a 41 y.o. male   PRIMARY DIAGNOSIS: Closed fracture of medial portion of left tibial plateau, initial encounter [S82.132A] <principal problem not specified> <principal problem not specified>  Procedure(s) (LRB):  LEFT LATERAL TIBIAL PLATEAU OPEN REDUCTION INTERNAL FIXATION  CHOICE ANES (Left)  1 Day Post-Op  ICD-10: Treatment Diagnosis:    · Difficulty in walking, Not elsewhere classified (R26.2)  · Repeated Falls (R29.6)  · History of falling (Z91.81)   Precaution/Allergies:  Review of patient's allergies indicates no known allergies. ASSESSMENT:     Mr. Kamilla Bowden is a 41 y.o. male admitted s/p above surgery. He lives with spouse in a single story home with 3 step to enter and 0 handrails. He fractured his tibial plateau on August 1, 2018 and has been using crutches (but reports he has been bearing weight on LLE during this time). He is supine on contact and agreeable to therapy. He is supine on contact and agreeable to therapy. He required cues for bed mobility to assist his LLE to edge of bed with minimal assist. Patient required contact guard assist x2 to stand to rolling walker and cues for hand placement. Treatment initiated to include gait training with rolling walker and minimal assist as well as step by step instructions for walker negotiation to ambulate 5' to recliner. After a short rest, treatment continued and patient able to stand and ambulate with minimal cues back to edge of bed with only contact guard assistance. Cues required to reach for bed prior to sitting. Patient able to return to supine with stand by assist and using UE to assist LLE into bed. Patient placed in CPM per MD recommendation (0-45 degrees, 2 cycles per minute with brace unlocked). Plan to assess patient on crutches this PM, discussed with Aleja Kunz NP.  Ayanna Gamino is currently functioning below his baseline and would benefit from skilled PT during acute care stay to maximize safety and independence with functional mobility. This section established at most recent assessment   PROBLEM LIST (Impairments causing functional limitations):  1. Decreased Strength  2. Decreased ADL/Functional Activities  3. Decreased Transfer Abilities  4. Decreased Ambulation Ability/Technique  5. Decreased Balance  6. Increased Pain  7. Decreased Knowledge of Precautions  8. Decreased Traill with Home Exercise Program   INTERVENTIONS PLANNED: (Benefits and precautions of physical therapy have been discussed with the patient.)  1. Balance Exercise  2. Bed Mobility  3. Family Education  4. Gait Training  5. Home Exercise Program (HEP)  6. Therapeutic Activites  7. Therapeutic Exercise/Strengthening  8. Transfer Training  9. Patient Education  10. Group Therapy     TREATMENT PLAN: Frequency/Duration: twice daily for duration of hospital stay  Rehabilitation Potential For Stated Goals: Good     RECOMMENDED REHABILITATION/EQUIPMENT: (at time of discharge pending progress): Due to the probability of continued deficits (see above) this patient will likely need continued skilled physical therapy after discharge. Equipment:    None at this time              HISTORY:   History of Present Injury/Illness (Reason for Referral): OPEN REDUCTION INTERNAL FIXATION OF LEFT TIBIAL PLATEAU  Past Medical History/Comorbidities:   Mr. Lv Nguyen  has a past medical history of Irregular heart beat and Urinary tract infection. He also has no past medical history of Adverse effect of anesthesia; Difficult intubation; Malignant hyperthermia due to anesthesia; Nausea & vomiting; or Pseudocholinesterase deficiency. Mr. Lv Nguyen  has a past surgical history that includes hx heent.   Social History/Living Environment:   Home Environment: Private residence  # Steps to Enter: 3  Rails to Enter: No  One/Two Story Residence: One story  Living Alone: No  Support Systems: Spouse/Significant Other/Partner, Child(senait)  Patient Expects to be Discharged to[de-identified] Private residence  Current DME Used/Available at Home: Crutches  Tub or Shower Type: Tub/Shower combination  Prior Level of Function/Work/Activity:  Patient independent with all mobility at baseline, works as a  and drives. He has been ambulating with crutches since the fracture. Number of Personal Factors/Comorbidities that affect the Plan of Care: 3+: HIGH COMPLEXITY   EXAMINATION:   Most Recent Physical Functioning:   Gross Assessment:  AROM: Generally decreased, functional  PROM: Generally decreased, functional  Strength: Generally decreased, functional  Coordination: Generally decreased, functional  Tone: Normal  Sensation: Intact               Posture:  Posture (WDL): Exceptions to WDL  Posture Assessment: Forward head  Balance:  Sitting: Intact  Standing: Impaired  Standing - Static: Fair  Standing - Dynamic : Fair Bed Mobility:  Rolling: Minimum assistance  Supine to Sit: Minimum assistance  Sit to Supine: Contact guard assistance  Scooting: Contact guard assistance  Wheelchair Mobility:     Transfers:  Sit to Stand: Contact guard assistance;Assist x2  Stand to Sit: Contact guard assistance  Bed to Chair: Contact guard assistance;Assist x2;Minimum assistance  Gait:  Right Side Weight Bearing: Full  Left Side Weight Bearing: Non-weight bearing  Base of Support: Center of gravity altered;Narrowed  Speed/Rosario: Slow;Shuffled;Pace decreased (<100 feet/min)  Step Length: Left shortened  Gait Abnormalities: Decreased step clearance; Path deviations; Shuffling gait; Step to gait;Trunk sway increased  Distance (ft): 5 Feet (ft) (x2)  Assistive Device: Walker, rolling;Gait belt  Ambulation - Level of Assistance: Contact guard assistance;Minimal assistance  Interventions: Manual cues; Safety awareness training;Verbal cues; Visual/Demos      Body Structures Involved:  1. Nerves  2. Bones  3. Joints  4. Muscles  5.  Ligaments Body Functions Affected:  1. Sensory/Pain  2. Neuromusculoskeletal  3. Movement Related Activities and Participation Affected:  1. Mobility  2. Self Care  3. Domestic Life  4. Interpersonal Interactions and Relationships  5. Community, Social and [place]   Number of elements that affect the Plan of Care: 4+: HIGH COMPLEXITY   CLINICAL PRESENTATION:   Presentation: Stable and uncomplicated: LOW COMPLEXITY   CLINICAL DECISION MAKIN [facility] Mobility Inpatient Short Form  How much difficulty does the patient currently have. .. Unable A Lot A Little None   1. Turning over in bed (including adjusting bedclothes, sheets and blankets)? [] 1   [] 2   [x] 3   [] 4   2. Sitting down on and standing up from a chair with arms ( e.g., wheelchair, bedside commode, etc.)   [] 1   [] 2   [x] 3   [] 4   3. Moving from lying on back to sitting on the side of the bed? [] 1   [] 2   [x] 3   [] 4   How much help from another person does the patient currently need. .. Total A Lot A Little None   4. Moving to and from a bed to a chair (including a wheelchair)? [] 1   [x] 2   [] 3   [] 4   5. Need to walk in hospital room? [] 1   [x] 2   [] 3   [] 4   6. Climbing 3-5 steps with a railing? [x] 1   [] 2   [] 3   [] 4   © , Trustees of [address] [place] [address], under license to [employer]. All rights reserved      Score:  Initial: 14 Most Recent: X (Date: -- )    Interpretation of Tool:  Represents activities that are increasingly more difficult (i.e. Bed mobility, Transfers, Gait). Score 24 23 22-20 19-15 14-10 9-7 6     Modifier CH CI CJ CK CL CM CN      ?  Mobility - Walking and Moving Around:     - CURRENT STATUS: CL - 60%-79% impaired, limited or restricted    - GOAL STATUS: CK - 40%-59% impaired, limited or restricted    - D/C STATUS:  ---------------To be determined---------------  Payor: [address] [place] [address] / Plan: [id number] [place] [address] / Product Type: Workers Comp /      Medical Necessity:     · Patient demonstrates good rehab potential due to higher previous functional level. Reason for Services/Other Comments:  · Patient continues to require modification of therapeutic interventions to increase complexity of exercises. Use of outcome tool(s) and clinical judgement create a POC that gives a: Clear prediction of patient's progress: LOW COMPLEXITY            TREATMENT:   (In addition to Assessment/Re-Assessment sessions the following treatments were rendered)   Pre-treatment Symptoms/Complaints:    Pain: Initial:   Pain Intensity 1: 4  Post Session:  4/10     Gait Training (  8 minutes):  Gait training to improve and/or restore physical functioning as related to mobility, strength, balance and coordination. Ambulated 5 Feet (ft) (x2) with Contact guard assistance;Minimal assistance using a Walker, rolling;Gait belt and moderate Manual cues; Safety awareness training;Verbal cues; Visual/Demos related to their stance phase and stride length to promote proper body alignment and promote proper body posture. Instruction in performance of walker negotiation and sequencing to correct stance phase, stride length and NWB LLE. Therapeutic Activity: (    15 minutes): Therapeutic activities including Bed transfers, Chair transfers and standing balance activities to improve mobility, strength and balance. Required moderate Manual cues; Safety awareness training;Verbal cues; Visual/Demos to promote static and dynamic balance in standing and for sequencing prior to sit<>stand. Braces/Orthotics/Lines/Etc:   · IV  · O2 Device: Room air  Treatment/Session Assessment:    · Response to Treatment:  Patient tolerated well with no medical complications.   · Interdisciplinary Collaboration:   o Physical Therapist  o Registered Nurse  · After treatment position/precautions:   o Supine in bed  o Bed/Chair-wheels locked  o Bed in low position  o Call light within reach  o RN notified  o patient on CPM   · Compliance with Program/Exercises: Will assess as treatment progresses. · Recommendations/Intent for next treatment session: \"Next visit will focus on advancements to more challenging activities and reduction in assistance provided\".   Total Treatment Duration:  PT Patient Time In/Time Out  Time In: 0939  Time Out: [address], DPT

## 2018-08-16 NOTE — PROGRESS NOTES
Problem: Mobility Impaired (Adult and Pediatric)  Goal: *Acute Goals and Plan of Care (Insert Text)  STG:  (1.)Mr. Filemon Wilson will move from supine to sit and sit to supine , scoot up and down and roll side to side with SUPERVISION within 3 treatment day(s). (2.)Mr. Filemon Wilson will transfer from bed to chair and chair to bed with STAND BY ASSIST using the least restrictive device within 3 treatment day(s). (3.)Mr. Filemon Wilson will ambulate with STAND BY ASSIST for 50+ feet with the least restrictive device within 3 treatment day(s). (4.)Mr. Filemon Wilson will maintain NWB LLE precautions throughout all functional mobility within 3 days with 0 verbal cues. (5.)Mr. Filemon Wilson will perform standing static and dynamic balance activities x 15 minutes with STAND BY ASSIST to improve safety within 3 day(s). LTG:  (1.)Mr. Filemon Wilson will move from supine to sit and sit to supine , scoot up and down and roll side to side in bed with MODIFIED INDEPENDENCE within 7 treatment day(s). (2.)Mr. Filemon Wilson will transfer from bed to chair and chair to bed with MODIFIED INDEPENDENCE using the least restrictive device within 7 treatment day(s). (3.)Mr. Filemon Wilson will ambulate with MODIFIED INDEPENDENCE for 150+ feet with the least restrictive device within 7 treatment day(s). (4.)Mr. Filemon Wilson will perform standing static and dynamic balance activities x 15 minutes with MODIFIED INDEPENDENCE to improve safety within 7 day(s). (5.)Mr. Filemon Wilson will ascend and descend 3 stairs using 0 hand rail(s) with SUPERVISION to improve functional mobility and safety within 7 day(s). ________________________________________________________________________________________________      PHYSICAL THERAPY: Daily Note, Treatment Day: Day of Assessment, PM 8/16/2018  OBSERVATION: Hospital Day: 2  Payor: [employer] / Plan: [address] / Product Type:  Workers Comp /    NWB LLE; CPM 0-45 degrees, 2 cycles per minute, brace locked at 30 degrees during ambulation, unlocked in bed and chair  NAME/AGE/GENDER: Rk Larsen is a 41 y.o. male   PRIMARY DIAGNOSIS: Closed fracture of medial portion of left tibial plateau, initial encounter [S82.132A] <principal problem not specified> <principal problem not specified>  Procedure(s) (LRB):  LEFT LATERAL TIBIAL PLATEAU OPEN REDUCTION INTERNAL FIXATION  CHOICE ANES (Left)  1 Day Post-Op  ICD-10: Treatment Diagnosis:    · Difficulty in walking, Not elsewhere classified (R26.2)  · Repeated Falls (R29.6)  · History of falling (Z91.81)   Precaution/Allergies:  Review of patient's allergies indicates no known allergies. ASSESSMENT:     Mr. Freddie Barrera is a 41 y.o. male admitted s/p above surgery. He lives with spouse in a single story home with 3 step to enter and 0 handrails. He fractured his tibial plateau on August 1, 2018 and has been using crutches (but reports he has been bearing weight on LLE during this time). He is supine on contact and agreeable to therapy. He is supine on contact and agreeable to therapy. He required cues for bed mobility to assist his LLE to edge of bed with minimal assist. Patient required contact guard assist x2 to stand to rolling walker and cues for hand placement. Treatment initiated to include gait training with rolling walker and minimal assist as well as step by step instructions for walker negotiation to ambulate 5' to recliner. After a short rest, treatment continued and patient able to stand and ambulate with minimal cues back to edge of bed with only contact guard assistance. Cues required to reach for bed prior to sitting. Patient able to return to supine with stand by assist and using UE to assist LLE into bed. Patient placed in CPM per MD recommendation (0-45 degrees, 2 cycles per minute with brace unlocked). Plan to assess patient on crutches this PM, discussed with Mark Kilgore NP.  Rk Larsen is currently functioning below his baseline and would benefit from skilled PT during acute care stay to maximize safety and independence with functional mobility. PM Note: patient supine on contact and agreeable to therapy. He required stand by assist to transfer to sitting, stood with CGA and increased ambulation distance to 76' with crutches and CGA to SBA with cues on sequencing with sit<>stand and negotiating with crutches. Educated on stair training and patient able to go up and down 3 steps with crutches, no handrails, CGA and maximal verbal cues. Patient and wife report increased comfort with stair training afterwards. He was rolled back to room and completed an additional transfer to recliner with stand by assistance and verbal cues. Good progress towards all goals this PM, will continue therapy efforts. This section established at most recent assessment   PROBLEM LIST (Impairments causing functional limitations):  1. Decreased Strength  2. Decreased ADL/Functional Activities  3. Decreased Transfer Abilities  4. Decreased Ambulation Ability/Technique  5. Decreased Balance  6. Increased Pain  7. Decreased Knowledge of Precautions  8. Decreased [place] with Home Exercise Program   INTERVENTIONS PLANNED: (Benefits and precautions of physical therapy have been discussed with the patient.)  1. Balance Exercise  2. Bed Mobility  3. Family Education  4. Gait Training  5. Home Exercise Program (HEP)  6. Therapeutic Activites  7. Therapeutic Exercise/Strengthening  8. Transfer Training  9. Patient Education  10. Group Therapy     TREATMENT PLAN: Frequency/Duration: twice daily for duration of hospital stay  Rehabilitation Potential For Stated Goals: Good     RECOMMENDED REHABILITATION/EQUIPMENT: (at time of discharge pending progress): Due to the probability of continued deficits (see above) this patient will likely need continued skilled physical therapy after discharge.   Equipment:    None at this time              HISTORY:   History of Present Injury/Illness (Reason for Referral): OPEN REDUCTION INTERNAL FIXATION OF LEFT TIBIAL PLATEAU  Past Medical History/Comorbidities:   Mr. Jose Huang  has a past medical history of Irregular heart beat and Urinary tract infection. He also has no past medical history of Adverse effect of anesthesia; Difficult intubation; Malignant hyperthermia due to anesthesia; Nausea & vomiting; or Pseudocholinesterase deficiency. Mr. Jose Huang  has a past surgical history that includes hx heent. Social History/Living Environment:   Home Environment: Private residence  # Steps to Enter: 3  Rails to Enter: No  One/Two Story Residence: One story  Living Alone: No  Support Systems: Spouse/Significant Other/Partner, Child(senait)  Patient Expects to be Discharged to[de-identified] Private residence  Current DME Used/Available at Home: Crutches  Tub or Shower Type: Tub/Shower combination  Prior Level of Function/Work/Activity:  Patient independent with all mobility at baseline, works as a  and drives. He has been ambulating with crutches since the fracture. Number of Personal Factors/Comorbidities that affect the Plan of Care: 3+: HIGH COMPLEXITY   EXAMINATION:   Most Recent Physical Functioning:   Gross Assessment:  AROM: Generally decreased, functional  PROM: Generally decreased, functional  Strength: Generally decreased, functional  Coordination: Generally decreased, functional  Tone: Normal  Sensation: Intact               Posture:  Posture (WDL): Exceptions to WDL  Posture Assessment:  Forward head  Balance:  Sitting: Intact  Standing: Impaired  Standing - Static: Good  Standing - Dynamic : Fair (+) Bed Mobility:  Rolling: Stand-by assistance  Supine to Sit: Stand-by assistance  Sit to Supine: Stand-by assistance  Scooting: Stand-by assistance  Interventions: Safety awareness training;Verbal cues  Wheelchair Mobility:     Transfers:  Sit to Stand: Stand-by assistance  Stand to Sit: Stand-by assistance  Bed to Chair: Contact guard assistance;Assist x2;Minimum assistance  Interventions: Manual cues; Safety awareness training;Visual cues  Gait:  Right Side Weight Bearing: Full  Left Side Weight Bearing: Non-weight bearing  Base of Support: Center of gravity altered;Narrowed  Speed/Rosario: Slow;Shuffled;Pace decreased (<100 feet/min)  Step Length: Left shortened  Gait Abnormalities: Decreased step clearance;Shuffling gait; Step to gait  Distance (ft): 75 Feet (ft)  Assistive Device: Crutches;Gait belt  Ambulation - Level of Assistance: Stand-by assistance;Contact guard assistance  Number of Stairs Trained: 3  Stairs - Level of Assistance: Contact guard assistance  Rail Use: None  Interventions: Manual cues; Safety awareness training      Body Structures Involved:  1. Nerves  2. Bones  3. Joints  4. Muscles  5. Ligaments Body Functions Affected:  1. Sensory/Pain  2. Neuromusculoskeletal  3. Movement Related Activities and Participation Affected:  1. Mobility  2. Self Care  3. Domestic Life  4. Interpersonal Interactions and Relationships  5. Community, Social and Terrell Anderson   Number of elements that affect the Plan of Care: 4+: HIGH COMPLEXITY   CLINICAL PRESENTATION:   Presentation: Stable and uncomplicated: LOW COMPLEXITY   CLINICAL DECISION MAKIN [facility] Mobility Inpatient Short Form  How much difficulty does the patient currently have. .. Unable A Lot A Little None   1. Turning over in bed (including adjusting bedclothes, sheets and blankets)? [] 1   [] 2   [x] 3   [] 4   2. Sitting down on and standing up from a chair with arms ( e.g., wheelchair, bedside commode, etc.)   [] 1   [] 2   [x] 3   [] 4   3. Moving from lying on back to sitting on the side of the bed? [] 1   [] 2   [x] 3   [] 4   How much help from another person does the patient currently need. .. Total A Lot A Little None   4. Moving to and from a bed to a chair (including a wheelchair)? [] 1   [x] 2   [] 3   [] 4   5. Need to walk in hospital room?    [] 1 [x] 2   [] 3   [] 4   6. Climbing 3-5 steps with a railing? [x] 1   [] 2   [] 3   [] 4   © 2007, Trustees of [address] [place] [address] [address] [address], under license to [employer]. All rights reserved      Score:  Initial: 14 Most Recent: X (Date: -- )    Interpretation of Tool:  Represents activities that are increasingly more difficult (i.e. Bed mobility, Transfers, Gait). Score 24 23 22-20 19-15 14-10 9-7 6     Modifier CH CI CJ CK CL CM CN      ? Mobility - Walking and Moving Around:     - CURRENT STATUS: CL - 60%-79% impaired, limited or restricted    - GOAL STATUS: CK - 40%-59% impaired, limited or restricted    - D/C STATUS:  ---------------To be determined---------------  Payor: [employer] / Plan: [address] [place] [address] / Product Type: Workers Comp /      Medical Necessity:     · Patient demonstrates good rehab potential due to higher previous functional level. Reason for Services/Other Comments:  · Patient continues to require modification of therapeutic interventions to increase complexity of exercises. Use of outcome tool(s) and clinical judgement create a POC that gives a: Clear prediction of patient's progress: LOW COMPLEXITY            TREATMENT:   (In addition to Assessment/Re-Assessment sessions the following treatments were rendered)   Pre-treatment Symptoms/Complaints:    Pain: Initial:   Pain Intensity 1: 4  Post Session:  4/10     Gait Training (   minutes):  Gait training to improve and/or restore physical functioning as related to mobility, strength, balance and coordination. Ambulated 75 Feet (ft) with Stand-by assistance;Contact guard assistance using a Crutches;Gait belt and moderate Manual cues; Safety awareness training related to their stance phase and stride length to promote proper body alignment and promote proper body posture.   Instruction in performance of walker negotiation and sequencing to correct stance phase, stride length and NWB CELESTINOE.    Therapeutic Activity: (    45 minutes): Therapeutic activities including Bed transfers, Chair transfers, ambulation, stair training and standing balance activities to improve mobility, strength and balance. Required moderate Manual cues; Safety awareness training to promote static and dynamic balance in standing and for sequencing on stairs. Braces/Orthotics/Lines/Etc:   · O2 Device: Room air  Treatment/Session Assessment:    · Response to Treatment:  Patient tolerated well with no medical complications. · Interdisciplinary Collaboration:   o Physical Therapist  o Registered Nurse  · After treatment position/precautions:   o Up in chair  o Bed/Chair-wheels locked  o Bed in low position  o Call light within reach  o RN notified   · Compliance with Program/Exercises: Will assess as treatment progresses. · Recommendations/Intent for next treatment session: \"Next visit will focus on advancements to more challenging activities and reduction in assistance provided\".   Total Treatment Duration:  PT Patient Time In/Time Out  Time In: 1406  Time Out: 1406 [facility]

## 2018-08-16 NOTE — BRIEF OP NOTE
BRIEF OPERATIVE NOTE    Date of Procedure: 8/15/2018   Preoperative Diagnosis: Closed fracture of medial portion of left tibial plateau, initial encounter [S82.132A]  Postoperative Diagnosis: Left lateral tibial plateau fracture    Procedure(s):  LEFT LATERAL TIBIAL PLATEAU OPEN REDUCTION INTERNAL FIXATION    Surgeon(s) and Role:     * Sravanthi Uriarte MD - Primary         Surgical Assistant: NONE    Surgical Staff:  Circ-1: Gunner Gardner RN  Radiology Technician: Amber Mederos RT, R  Scrub Tech-1: Jermaine Oden  Scrub Tech-2: Dariel Sarmiento  Scrub Private/Assistant: Chaparrita Gil  Event Time In   Incision Start 1642   Incision Close 1743     Anesthesia: General   Estimated Blood Loss: TOURNIQUET  Specimens: * No specimens in log *   Findings: NONE   Complications: NONE  Implants:   Implant Name Type Inv.  Item Serial No.  Lot No. LRB No. Used Action   CALE HAM DRL INJCT 3ML --  - [id number]  PUTTY NORIAN DRL INJCT 3ML --   [employer] [place] [id number] Left 1 Implanted   PLATE TIB PROX LB 8H 128MM LT -- LCP - [id number]  PLATE TIB PROX LB 8H 128MM LT -- LCP  [employer] [place] [id number] Left 1 Implanted   SCR BNE LCK ST T25 3.5X28MM SS --  - [id number]  SCR BNE LCK ST T25 3.5X28MM SS --   [employer] [place] [id number] Left 2 Implanted   SCR BNE LCK ST T25 3.5X36MM SS --  - [id number]  SCR BNE LCK ST T25 3.5X36MM SS --   [employer] [place] [id number] Left 1 Implanted   SCR BNE LCK ST T25 3.5X50MM SS --  - [id number]  SCR BNE LCK ST T25 3.5X50MM SS --   [employer] [place] [id number] Left 1 Implanted   SCR BNE LCK ST T25 3.5X75MM SS --  - [id number]  SCR BNE LCK ST T25 3.5X75MM SS --   [employer] [place] [id number] Left 3 Implanted

## 2018-08-16 NOTE — PROGRESS NOTES
Problem: Falls - Risk of  Goal: *Absence of Falls  Document Chetna Fall Risk and appropriate interventions in the flowsheet.    Outcome: Progressing Towards Goal  Fall Risk Interventions:  Mobility Interventions: Patient to call before getting OOB         Medication Interventions: Patient to call before getting OOB    Elimination Interventions: Call light in reach, Patient to call for help with toileting needs    History of Falls Interventions: Door open when patient unattended

## 2018-08-16 NOTE — PROGRESS NOTES
Spoke with patient and wife Florian Nunn regarding discharge planning. Lives with wife in own one story home with 2 step entry  Prior to admission independent with ADL's, working and driving. PCP-none and wife states they just got insurance on Aug 1st and they are researching a new PCP. They will obtain one on their own once they decide. Declined help from CM at this time. DME- crutches  [facility]. Denies any problems affording Rx. Working with PT.  Swati Pitts initiated. CM will continue to follow. Care Management Interventions  PCP Verified by CM: Yes  Mode of Transport at Discharge:  Other (see comment)  Transition of Care Consult (CM Consult): Discharge Planning  Discharge Durable Medical Equipment: Yes (rolling walker)  Physical Therapy Consult: Yes  Current Support Network: Lives with Spouse, Own Home  Confirm Follow Up Transport: Family  Plan discussed with Pt/Family/Caregiver: Yes  Freedom of Choice Offered: Yes  Discharge Location  Discharge Placement: Home with home health

## 2018-08-16 NOTE — PROGRESS NOTES
Patient had an episode of nausea/vomiting before 0100 this morning. Patient was eating junk food (doritos, etc.) a few hours prior. Zofran IV was given with relief. Dilaudid IV has helped with pain vs the Oxycodone PO. Patient states he took Tramadol for pain at home and it helped so we discussed taking it next time when pain medication is needed to see how he does with it. Will continue to monitor.

## 2018-08-16 NOTE — PROGRESS NOTES
Spiritual Care Visit. Initial visit. Patient was visited by [facility]. Entered by Mario Clayton, Staff Chaplain. Pierce, Veronica

## 2018-08-16 NOTE — PROGRESS NOTES
Received call from Radha Chase [facility]  at [place]) stating all DME will need to be billed through one call care management -phone # [phone]. [id number] [facility] notified.  All pertinent patient information faxed to Frank Salcedo per her request.     Contact # for Radha Chase  Phone# [phone]  Fax 3 [phone]

## 2018-08-16 NOTE — PROGRESS NOTES
Patient has no c/o pain or nausea at this time. Scheduled Tylenol given. Patient educated to call for nurse when in pain and not when pain is at its worse.

## 2018-08-16 NOTE — DISCHARGE INSTRUCTIONS
· NON-WEIGHT BEARING LEFT LEG  · ELEVATE LEFT LEG  · KEEP DRESSING CLEAN AND DRY  · HINGED BRACE LEFT LEG - UNLOCKED IN BED OR CHAIR; LOCKED AT 30 DEGREES WHILE AMBULATING  · CPM WHILE IN HINGED BRACE - 0-45 DEGREES AT 2 CYCLES PER MIN, 4 HOURS IN AM AND 4 HOURS IN PM  · FOLLOW-UP APPT WITH DR Francis Romero - 8/24/18  @ 9:40 AM  · PRESCRIPTION FOR OXYCODONE 5 MG GIVEN TO PATIENT  · LOVENOX 40 MG CALLED IN TO [facility]    DISCHARGE SUMMARY from Nurse    PATIENT INSTRUCTIONS:    After general anesthesia or intravenous sedation, for 24 hours or while taking prescription Narcotics:  · Limit your activities  · Do not drive and operate hazardous machinery  · Do not make important personal or business decisions  · Do  not drink alcoholic beverages  · If you have not urinated within 8 hours after discharge, please contact your surgeon on call. Report the following to your surgeon:  · Excessive pain, swelling, redness or odor of or around the surgical area  · Temperature over 100.5  · Nausea and vomiting lasting longer than 4 hours or if unable to take medications  · Any signs of decreased circulation or nerve impairment to extremity: change in color, persistent  numbness, tingling, coldness or increase pain  · Any questions    What to do at Home:  Recommended activity: See surgical instructions, resume home diet as tolerated. *  Please give a list of your current medications to your Primary Care Provider. *  Please update this list whenever your medications are discontinued, doses are      changed, or new medications (including over-the-counter products) are added. *  Please carry medication information at all times in case of emergency situations. These are general instructions for a healthy lifestyle:    No smoking/ No tobacco products/ Avoid exposure to second hand smoke  Surgeon General's Warning:  Quitting smoking now greatly reduces serious risk to your health.     Obesity, smoking, and sedentary lifestyle greatly increases your risk for illness    A healthy diet, regular physical exercise & weight monitoring are important for maintaining a healthy lifestyle    You may be retaining fluid if you have a history of heart failure or if you experience any of the following symptoms:  Weight gain of 3 pounds or more overnight or 5 pounds in a week, increased swelling in our hands or feet or shortness of breath while lying flat in bed. Please call your doctor as soon as you notice any of these symptoms; do not wait until your next office visit. Recognize signs and symptoms of STROKE:    F-face looks uneven    A-arms unable to move or move unevenly    S-speech slurred or non-existent    T-time-call 911 as soon as signs and symptoms begin-DO NOT go       Back to bed or wait to see if you get better-TIME IS BRAIN. Warning Signs of HEART ATTACK     Call 911 if you have these symptoms:   Chest discomfort. Most heart attacks involve discomfort in the center of the chest that lasts more than a few minutes, or that goes away and comes back. It can feel like uncomfortable pressure, squeezing, fullness, or pain.  Discomfort in other areas of the upper body. Symptoms can include pain or discomfort in one or both arms, the back, neck, jaw, or stomach.  Shortness of breath with or without chest discomfort.  Other signs may include breaking out in a cold sweat, nausea, or lightheadedness. Don't wait more than five minutes to call 911 - MINUTES MATTER! Fast action can save your life. Calling 911 is almost always the fastest way to get lifesaving treatment. Emergency Medical Services staff can begin treatment when they arrive -- up to an hour sooner than if someone gets to the hospital by car. The discharge information has been reviewed with the patient. The patient verbalized understanding.   Discharge medications reviewed with the patient and appropriate educational materials and side effects teaching were provided.   ___________________________________________________________________________________________________________________________________

## 2018-08-22 NOTE — OP NOTES
[facility] REPORT    Bill Mohamud  MR#: [id number]  : 1975  ACCOUNT #: [de-identified]   DATE OF SERVICE: 08/15/2018    PREOPERATIVE DIAGNOSIS:  Left lateral tibial plateau fracture. POSTOPERATIVE DIAGNOSIS:  Left lateral tibial plateau fracture. PROCEDURE PERFORMED:  ORIF left lateral tibial plateau fracture. SURGEON:  Nickie Treviño MD    ASSISTANT:       ANESTHESIA:  General with a block for postoperative pain relief. IMPLANTS:      SPECIMENS REMOVED:       DESCRIPTION OF PROCEDURE:  The patient was brought to the operative suite, placed in supine position. After adequate anesthesia was achieved in the form of general, the patient had a tourniquet applied to left thigh with adequate padding and a soft roll, preset to a level of 300 mmHg. Left lower extremity was then prepped and draped in the usual sterile fashion, elevated, exsanguinated gravity, tourniquet was inflated. S-shaped incision was made over the proximal tibia. Incision runs along the anterior aspect of the tibia curving posteriorly over Gerdy's tubercle and then up the lateral aspect of the distal femur. Hemostasis achieved with Bovie cautery. Fascia incised along the line of skin incision. The anterior tib muscle was taken down off the tibia extraperiosteally except at the level of Gerdy's tubercle where subperiosteal dissection was performed. The fascia was then incised longitudinally along the lateral aspect of the distal femur. The coronary ligament was opened. A fair amount of blood from the knee joint was released. The fracture was identified. It is a depressed, mostly posterolateral plateau. Airplane drill bit was used to drill a hole in the lateral aspect of the proximal tibia. Through this portal, the tap was inserted and under both direct visualization and fluoroscopic visualization, the joint was tapped back to the appropriate height.   It was then secured with Synthes 3.5 mm proximal tibial locking plate. The plate was first pulled through the bone with 2 partially threaded conical screws. This allowed for excellent compression of the lateral plateau. Two locking screws were then placed and then the original conical screws were placed with locking screws. The plate was then pulled to the bone with a 3.5 cortical screw further compressing the proximal tibia. Additional locking screw was then placed through the plate into the shaft and that original cortical screw was removed. At this time through the portal, Norian was injected in order to fill the void left from elevating the joint fracture. No Norian was allowed to get into the joint. At this point, AP, lateral and caudal fluoroscopic images confirmed the fracture was reduced anatomically and the hardware was in good position. The wound was irrigated with normal saline. The coronary ligament was repaired. Remainder of the wound was closed in layers. A sterile compressive dressing and knee immobilizer were applied. The patient's tourniquet was deflated and he was transferred to the recovery room, alert and oriented under the care of anesthesia. ESTIMATED BLOOD LOSS:  Minimal.    FLUIDS:  See Anesthesia record. CLOSURE:  Primary. COMPLICATIONS:  None. MD Torsten Perez Ra / Jaymie Crow  D: 08/21/2018 18:36     T: 08/21/2018 21:08  JOB #: [id number]

## 2022-03-19 PROBLEM — S82.142A FRACTURE OF LEFT TIBIAL PLATEAU: Status: ACTIVE | Noted: 2018-08-15

## 2022-04-07 ENCOUNTER — HOSPITAL ENCOUNTER (EMERGENCY)
Age: 47
Discharge: HOME | End: 2022-04-07
Attending: EMERGENCY MEDICINE
Payer: COMMERCIAL

## 2022-04-07 VITALS
HEART RATE: 72 BPM | HEIGHT: 71 IN | WEIGHT: 285 LBS | SYSTOLIC BLOOD PRESSURE: 152 MMHG | OXYGEN SATURATION: 95 % | RESPIRATION RATE: 19 BRPM | DIASTOLIC BLOOD PRESSURE: 96 MMHG | TEMPERATURE: 98 F | BODY MASS INDEX: 39.9 KG/M2

## 2022-04-07 DIAGNOSIS — I10 ACCELERATED HYPERTENSION: Primary | ICD-10-CM

## 2022-04-07 DIAGNOSIS — R20.2 PARESTHESIA: ICD-10-CM

## 2022-04-07 DIAGNOSIS — R42 DIZZINESS: ICD-10-CM

## 2022-04-07 LAB
ALBUMIN SERPL-MCNC: 4.5 G/DL (ref 3.5–5)
ALBUMIN/GLOB SERPL: 1.3 {RATIO}
ALP SERPL-CCNC: 128 U/L (ref 45–117)
ALT SERPL-CCNC: 50 U/L (ref 13–61)
ANION GAP SERPL CALC-SCNC: 13 MMOL/L (ref 7–16)
AST SERPL-CCNC: 29 U/L (ref 15–37)
BASOPHILS # BLD: 0.1 K/UL (ref 0–0.2)
BASOPHILS NFR BLD: 1 % (ref 0–2)
BILIRUB SERPL-MCNC: 0.4 MG/DL (ref 0.2–1.1)
BUN SERPL-MCNC: 9 MG/DL (ref 7–18)
CALCIUM SERPL-MCNC: 9.5 MG/DL (ref 8.3–10.4)
CHLORIDE SERPL-SCNC: 100 MMOL/L (ref 98–107)
CO2 SERPL-SCNC: 26 MMOL/L (ref 21–32)
CREAT SERPL-MCNC: 0.83 MG/DL (ref 0.8–1.5)
DIFFERENTIAL METHOD BLD: ABNORMAL
EOSINOPHIL # BLD: 0 K/UL (ref 0–0.8)
EOSINOPHIL NFR BLD: 0 % (ref 0.5–7.8)
ERYTHROCYTE [DISTWIDTH] IN BLOOD BY AUTOMATED COUNT: 12.5 % (ref 11.9–14.6)
GLOBULIN SER CALC-MCNC: 3.4 G/DL (ref 2.3–3.5)
GLUCOSE SERPL-MCNC: 101 MG/DL (ref 65–100)
HCT VFR BLD AUTO: 47.7 % (ref 41.1–50.3)
HGB BLD-MCNC: 16 G/DL (ref 13.6–17.2)
IMM GRANULOCYTES # BLD AUTO: 0.1 K/UL (ref 0–0.5)
IMM GRANULOCYTES NFR BLD AUTO: 1 % (ref 0–5)
INR PPP: 1
LYMPHOCYTES # BLD: 2.4 K/UL (ref 0.5–4.6)
LYMPHOCYTES NFR BLD: 23 % (ref 13–44)
MAGNESIUM SERPL-MCNC: 0.9 MG/DL (ref 1.2–2.6)
MCH RBC QN AUTO: 27.9 PG (ref 26.1–32.9)
MCHC RBC AUTO-ENTMCNC: 33.5 G/DL (ref 31.4–35)
MCV RBC AUTO: 83.2 FL (ref 79.6–97.8)
MONOCYTES # BLD: 0.6 K/UL (ref 0.1–1.3)
MONOCYTES NFR BLD: 6 % (ref 4–12)
NEUTS SEG # BLD: 7.4 K/UL (ref 1.7–8.2)
NEUTS SEG NFR BLD: 70 % (ref 43–78)
NRBC # BLD: 0 K/UL (ref 0–0.2)
PLATELET # BLD AUTO: 450 K/UL (ref 150–450)
PMV BLD AUTO: 9 FL (ref 9.4–12.3)
POTASSIUM SERPL-SCNC: 4.4 MMOL/L (ref 3.5–5.1)
PROT SERPL-MCNC: 7.9 G/DL (ref 6.4–8.2)
PROTHROMBIN TIME: 13.5 SEC (ref 12.6–14.5)
RBC # BLD AUTO: 5.73 M/UL (ref 4.23–5.6)
SODIUM SERPL-SCNC: 139 MMOL/L (ref 136–145)
TROPONIN T SERPL HS-MCNC: <6 NG/L (ref 0–22)
WBC # BLD AUTO: 10.6 K/UL (ref 4.3–11.1)

## 2022-04-07 PROCEDURE — 83735 ASSAY OF MAGNESIUM: CPT

## 2022-04-07 PROCEDURE — 99284 EMERGENCY DEPT VISIT MOD MDM: CPT

## 2022-04-07 PROCEDURE — 80053 COMPREHEN METABOLIC PANEL: CPT

## 2022-04-07 PROCEDURE — 85025 COMPLETE CBC W/AUTO DIFF WBC: CPT

## 2022-04-07 PROCEDURE — 84484 ASSAY OF TROPONIN QUANT: CPT

## 2022-04-07 PROCEDURE — 93005 ELECTROCARDIOGRAM TRACING: CPT | Performed by: EMERGENCY MEDICINE

## 2022-04-07 PROCEDURE — 85610 PROTHROMBIN TIME: CPT

## 2022-04-07 PROCEDURE — 74011250637 HC RX REV CODE- 250/637: Performed by: EMERGENCY MEDICINE

## 2022-04-07 RX ORDER — LISINOPRIL 20 MG/1
20 TABLET ORAL DAILY
COMMUNITY

## 2022-04-07 RX ORDER — LISINOPRIL 20 MG/1
20-40 TABLET ORAL DAILY
Qty: 62 TABLET | Refills: 11 | Status: SHIPPED | OUTPATIENT
Start: 2022-04-07

## 2022-04-07 RX ORDER — LISINOPRIL 20 MG/1
40 TABLET ORAL ONCE
Status: COMPLETED | OUTPATIENT
Start: 2022-04-07 | End: 2022-04-07

## 2022-04-07 RX ADMIN — LISINOPRIL 40 MG: 20 TABLET ORAL at 11:14

## 2022-04-07 NOTE — ED PROVIDER NOTES
44-year-old gentleman with history of uncontrolled hypertension due to not taking his medicines presents to the ER for evaluation of dizziness and numbness. Tuesday he noticed spell of nonvertiginous lightheaded dizziness which lasted for about a minute after standing up from a seated position. During this timeframe he noticed his left hand to have a pins-and-needles paresthesia, but not a true numbness. There is no other sensory symptoms throughout the rest of the body, no vision speech or motor deficits. Wednesday he had a spell as he was already up and about on his feet similar syndrome, no diaphoresis, no chest pain or pressure, no palpitations. This also lasted less than a minute and then dissipated, it was similarly accompanied by the left hand pins-and-needles paresthesia. Today he has had 2 similar spells 1 when he was seated in his chair and simply rolled over the reach magazine rack behind him, another when he was sitting still doing nothing. No history of heart trouble, he is supposed to be on 20 of lisinopril but takes it rarely if ever, does not check his blood pressure at home, or out and about. His dad had some heart disease the patient's not sure whether that was occlusive CAD versus dysrhythmia. Patient has never had a stress test before, he is a non-smoker with no history of diabetes. Wife notes him to get out of breath more easily than 1 would anticipate.   Most recent example was when he was on the floor, prone, looking for the remote control  Patient states walking up stairs, or up the hill from the lower office to the upper office work does not get him winded           Past Medical History:   Diagnosis Date    Irregular heart beat     as a teenager    Urinary tract infection        Past Surgical History:   Procedure Laterality Date    HX HEENT      wisdom teeth         Family History:   Problem Relation Age of Onset    Heart Disease Father        Social History Socioeconomic History    Marital status:      Spouse name: Not on file    Number of children: Not on file    Years of education: Not on file    Highest education level: Not on file   Occupational History    Not on file   Tobacco Use    Smoking status: Never Smoker    Smokeless tobacco: Current User   Substance and Sexual Activity    Alcohol use: No    Drug use: No    Sexual activity: Not on file   Other Topics Concern    Not on file   Social History Narrative    Not on file     Social Determinants of Health     Financial Resource Strain:     Difficulty of Paying Living Expenses: Not on file   Food Insecurity:     Worried About Running Out of Food in the Last Year: Not on file    Esteban of Food in the Last Year: Not on file   Transportation Needs:     Lack of Transportation (Medical): Not on file    Lack of Transportation (Non-Medical): Not on file   Physical Activity:     Days of Exercise per Week: Not on file    Minutes of Exercise per Session: Not on file   Stress:     Feeling of Stress : Not on file   Social Connections:     Frequency of Communication with Friends and Family: Not on file    Frequency of Social Gatherings with Friends and Family: Not on file    Attends [employer] Services: Not on file    Active Member of [address] [place] [address] [employer] or Organizations: Not on file    Attends Club or Organization Meetings: Not on file    Marital Status: Not on file   Intimate Partner Violence:     Fear of Current or Ex-Partner: Not on file    Emotionally Abused: Not on file    Physically Abused: Not on file    Sexually Abused: Not on file   Housing Stability:     Unable to Pay for Housing in the Last Year: Not on file    Number of [place] in the Last Year: Not on file    Unstable Housing in the Last Year: Not on file         ALLERGIES: Patient has no known allergies. Review of Systems   Constitutional: Negative for activity change, appetite change, chills and fever.    HENT: Negative for rhinorrhea and sore throat. Eyes: Negative for discharge and redness. Respiratory: Positive for shortness of breath. Negative for cough. Cardiovascular: Negative for chest pain and palpitations. Gastrointestinal: Negative for abdominal pain, nausea and vomiting. Genitourinary: Negative for difficulty urinating and dysuria. Musculoskeletal: Negative for arthralgias, back pain and gait problem. Skin: Negative for pallor and rash. Neurological: Negative for dizziness, facial asymmetry, speech difficulty, weakness, numbness (Paresthesia, not hypoesthesia) and headaches. All other systems reviewed and are negative. Vitals:    04/07/22 1053 04/07/22 1114 04/07/22 1115 04/07/22 1130   BP:  (!) 170/101 (!) 152/99 (!) 157/98   Pulse:  78 75 71   Resp:  21 25 19   Temp:       SpO2:  96% 96% 94%   Weight: 129.3 kg (285 lb)      Height: 5' 11\" (1.803 m)               Physical Exam  Vitals and nursing note reviewed. Constitutional:       General: He is not in acute distress. Appearance: Normal appearance. He is well-developed. He is not ill-appearing, toxic-appearing or diaphoretic. HENT:      Head: Normocephalic and atraumatic. Right Ear: External ear normal.      Left Ear: External ear normal.      Mouth/Throat:      Mouth: Mucous membranes are moist.      Pharynx: Oropharynx is clear. No oropharyngeal exudate or posterior oropharyngeal erythema. Eyes:      General: No scleral icterus. Right eye: No discharge. Left eye: No discharge. Extraocular Movements: Extraocular movements intact. Conjunctiva/sclera: Conjunctivae normal.      Pupils: Pupils are equal, round, and reactive to light. Right eye: Pupil is round and reactive. Funduscopic exam:     Right eye: AV nicking present. No hemorrhage or papilledema. Neck:      Thyroid: No thyromegaly. Trachea: Trachea normal.   Cardiovascular:      Rate and Rhythm: Normal rate and regular rhythm. Heart sounds: Normal heart sounds. No murmur heard. No gallop. Pulmonary:      Effort: Pulmonary effort is normal. No respiratory distress. Breath sounds: Normal breath sounds. No wheezing or rales. Abdominal:      General: Bowel sounds are normal.      Palpations: Abdomen is soft. There is no hepatomegaly, splenomegaly or pulsatile mass. Tenderness: There is no abdominal tenderness. There is no guarding. Musculoskeletal:         General: Normal range of motion. Cervical back: Normal range of motion and neck supple. Normal range of motion. Lymphadenopathy:      Cervical: No cervical adenopathy. Skin:     General: Skin is warm and dry. Neurological:      General: No focal deficit present. Mental Status: He is alert and oriented to person, place, and time. Mental status is at baseline. GCS: GCS eye subscore is 4. GCS verbal subscore is 5. GCS motor subscore is 6. Cranial Nerves: Cranial nerves are intact. Sensory: Sensation is intact. Motor: Motor function is intact. No abnormal muscle tone. Coordination: Coordination is intact. Gait: Gait is intact. Comments: cni 2-12   Normal finger-to-nose  No pronator drift  Normal sensation throughout    Clear speech, normal gait   Psychiatric:         Mood and Affect: Mood normal.         Behavior: Behavior normal.          MDM  Number of Diagnoses or Management Options  Accelerated hypertension: established and worsening  Dizziness: new and requires workup  Paresthesia: new and requires workup  Diagnosis management comments: Medical decision making note:  Nonspecific dizzy spells with some paresthesias to the left hand, nothing particularly suggestive of CVA or TIA syndrome. Rule out endorgan damage with EKG, troponin, renal function was. Funduscopy looks good.   Normal neurological examination  Start lisinopril, encourage getting a home blood pressure cuff close follow-up with a new PCP that his wife sees, as well as cardiology referral for his dyspnea  This concludes the \"medical decision making note\" part of this emergency department visit note.          Amount and/or Complexity of Data Reviewed  Clinical lab tests: reviewed and ordered  Decide to obtain previous medical records or to obtain history from someone other than the patient: yes  Obtain history from someone other than the patient: yes (Wife at bedside)    Risk of Complications, Morbidity, and/or Mortality  Presenting problems: moderate  Diagnostic procedures: low  Management options: low    Patient Progress  Patient progress: improved         Procedures

## 2022-04-07 NOTE — DISCHARGE INSTRUCTIONS
Start 1 baby aspirin every day    Start the lisinopril 20 mg each morning,  If your blood pressures are remaining over 150 on top, or over 85 on the bottom, take a second 20 mg for that day    Do not take more than 40 mg, or 2 tablets per day    Follow-up with your wife's physician at [facility], in [place], as anticipated. Also consider following up with Dr. Iron Garcia at [facility] cardiology for consideration of a stress test, in light of the shortness of breath.

## 2022-04-07 NOTE — ED NOTES
I have reviewed discharge instructions with the patient and spouse. The patient and spouse verbalized understanding. Patient left ED via Discharge Method: ambulatory to Home with spouse. Opportunity for questions and clarification provided. Patient given 1 scripts. To continue your aftercare when you leave the hospital, you may receive an automated call from our care team to check in on how you are doing. This is a free service and part of our promise to provide the best care and service to meet your aftercare needs.  If you have questions, or wish to unsubscribe from this service please call [phone]. Thank you for Choosing our [facility] Emergency Department.

## 2022-04-07 NOTE — ED TRIAGE NOTES
Pt ambulatory to room from [facility] with spouse. States he has been having intermittent spells of dizziness and left arm numbness over the last couple of days. States they last for a few minutes and resolve on their own. States he has had two episodes today, which has not happened. Hx of HTN, does not take his BP meds every day. Masked.

## 2024-08-29 SDOH — HEALTH STABILITY: PHYSICAL HEALTH: ON AVERAGE, HOW MANY DAYS PER WEEK DO YOU ENGAGE IN MODERATE TO STRENUOUS EXERCISE (LIKE A BRISK WALK)?: 0 DAYS

## 2024-08-30 ENCOUNTER — OFFICE VISIT (OUTPATIENT)
Dept: FAMILY MEDICINE CLINIC | Facility: CLINIC | Age: 49
End: 2024-08-30
Payer: COMMERCIAL

## 2024-08-30 VITALS
WEIGHT: 299.4 LBS | HEIGHT: 71 IN | BODY MASS INDEX: 41.92 KG/M2 | RESPIRATION RATE: 17 BRPM | SYSTOLIC BLOOD PRESSURE: 160 MMHG | HEART RATE: 72 BPM | OXYGEN SATURATION: 98 % | TEMPERATURE: 97.7 F | DIASTOLIC BLOOD PRESSURE: 120 MMHG

## 2024-08-30 DIAGNOSIS — I10 ESSENTIAL HYPERTENSION: Primary | ICD-10-CM

## 2024-08-30 DIAGNOSIS — Z13.220 ENCOUNTER FOR LIPID SCREENING FOR CARDIOVASCULAR DISEASE: ICD-10-CM

## 2024-08-30 DIAGNOSIS — Z13.6 ENCOUNTER FOR LIPID SCREENING FOR CARDIOVASCULAR DISEASE: ICD-10-CM

## 2024-08-30 DIAGNOSIS — E66.01 CLASS 3 SEVERE OBESITY WITHOUT SERIOUS COMORBIDITY WITH BODY MASS INDEX (BMI) OF 40.0 TO 44.9 IN ADULT, UNSPECIFIED OBESITY TYPE (HCC): ICD-10-CM

## 2024-08-30 DIAGNOSIS — Z76.89 ENCOUNTER TO ESTABLISH CARE WITH NEW DOCTOR: ICD-10-CM

## 2024-08-30 DIAGNOSIS — R09.81 NASAL CONGESTION: ICD-10-CM

## 2024-08-30 DIAGNOSIS — R68.82 LOW LIBIDO: ICD-10-CM

## 2024-08-30 LAB
ALBUMIN SERPL-MCNC: 3.7 G/DL (ref 3.5–5)
ALBUMIN/GLOB SERPL: 0.8 (ref 1–1.9)
ALP SERPL-CCNC: 128 U/L (ref 40–129)
ALT SERPL-CCNC: 49 U/L (ref 12–65)
ANION GAP SERPL CALC-SCNC: 10 MMOL/L (ref 9–18)
AST SERPL-CCNC: 49 U/L (ref 15–37)
BILIRUB SERPL-MCNC: 0.5 MG/DL (ref 0–1.2)
BUN SERPL-MCNC: 6 MG/DL (ref 6–23)
CALCIUM SERPL-MCNC: 9.5 MG/DL (ref 8.8–10.2)
CHLORIDE SERPL-SCNC: 101 MMOL/L (ref 98–107)
CHOLEST SERPL-MCNC: 201 MG/DL (ref 0–200)
CO2 SERPL-SCNC: 28 MMOL/L (ref 20–28)
CREAT SERPL-MCNC: 0.76 MG/DL (ref 0.8–1.3)
GLOBULIN SER CALC-MCNC: 4.6 G/DL (ref 2.3–3.5)
GLUCOSE SERPL-MCNC: 125 MG/DL (ref 70–99)
HDLC SERPL-MCNC: 33 MG/DL (ref 40–60)
HDLC SERPL: 6.1 (ref 0–5)
LDLC SERPL CALC-MCNC: 127 MG/DL (ref 0–100)
POTASSIUM SERPL-SCNC: 4.3 MMOL/L (ref 3.5–5.1)
PROT SERPL-MCNC: 8.3 G/DL (ref 6.3–8.2)
SODIUM SERPL-SCNC: 139 MMOL/L (ref 136–145)
TRIGL SERPL-MCNC: 204 MG/DL (ref 0–150)
VLDLC SERPL CALC-MCNC: 41 MG/DL (ref 6–23)

## 2024-08-30 PROCEDURE — G8427 DOCREV CUR MEDS BY ELIG CLIN: HCPCS | Performed by: FAMILY MEDICINE

## 2024-08-30 PROCEDURE — G8417 CALC BMI ABV UP PARAM F/U: HCPCS | Performed by: FAMILY MEDICINE

## 2024-08-30 PROCEDURE — 4004F PT TOBACCO SCREEN RCVD TLK: CPT | Performed by: FAMILY MEDICINE

## 2024-08-30 PROCEDURE — 3080F DIAST BP >= 90 MM HG: CPT | Performed by: FAMILY MEDICINE

## 2024-08-30 PROCEDURE — 99204 OFFICE O/P NEW MOD 45 MIN: CPT | Performed by: FAMILY MEDICINE

## 2024-08-30 PROCEDURE — 3077F SYST BP >= 140 MM HG: CPT | Performed by: FAMILY MEDICINE

## 2024-08-30 RX ORDER — FLUTICASONE PROPIONATE 50 MCG
2 SPRAY, SUSPENSION (ML) NASAL DAILY
Qty: 48 G | Refills: 1 | Status: SHIPPED | OUTPATIENT
Start: 2024-08-30

## 2024-08-30 RX ORDER — SILDENAFIL 50 MG/1
50 TABLET, FILM COATED ORAL DAILY PRN
Qty: 10 TABLET | Refills: 0 | Status: SHIPPED | OUTPATIENT
Start: 2024-08-30 | End: 2024-09-29

## 2024-08-30 RX ORDER — CETIRIZINE HYDROCHLORIDE 10 MG/1
10 TABLET ORAL DAILY
Qty: 30 TABLET | Refills: 0 | Status: SHIPPED | OUTPATIENT
Start: 2024-08-30

## 2024-08-30 RX ORDER — LISINOPRIL AND HYDROCHLOROTHIAZIDE 12.5; 2 MG/1; MG/1
1 TABLET ORAL DAILY
Qty: 30 TABLET | Refills: 0 | Status: SHIPPED | OUTPATIENT
Start: 2024-08-30 | End: 2024-09-29

## 2024-08-30 SDOH — ECONOMIC STABILITY: FOOD INSECURITY: WITHIN THE PAST 12 MONTHS, THE FOOD YOU BOUGHT JUST DIDN'T LAST AND YOU DIDN'T HAVE MONEY TO GET MORE.: NEVER TRUE

## 2024-08-30 SDOH — ECONOMIC STABILITY: FOOD INSECURITY: WITHIN THE PAST 12 MONTHS, YOU WORRIED THAT YOUR FOOD WOULD RUN OUT BEFORE YOU GOT MONEY TO BUY MORE.: NEVER TRUE

## 2024-08-30 SDOH — ECONOMIC STABILITY: INCOME INSECURITY: HOW HARD IS IT FOR YOU TO PAY FOR THE VERY BASICS LIKE FOOD, HOUSING, MEDICAL CARE, AND HEATING?: NOT HARD AT ALL

## 2024-08-30 ASSESSMENT — PATIENT HEALTH QUESTIONNAIRE - PHQ9
1. LITTLE INTEREST OR PLEASURE IN DOING THINGS: SEVERAL DAYS
SUM OF ALL RESPONSES TO PHQ9 QUESTIONS 1 & 2: 2
2. FEELING DOWN, DEPRESSED OR HOPELESS: SEVERAL DAYS
SUM OF ALL RESPONSES TO PHQ QUESTIONS 1-9: 2

## 2024-08-30 NOTE — PROGRESS NOTES
Dejon Gonzalez (:  1975) is a 49 y.o. male,New patient, here for evaluation of the following chief complaint(s):  New Patient and Establish Care    Assessment & Plan  Essential hypertension     Blood pressure is elevated at 160/120.  Has not been taking any of his blood pressure medications from the past.   - Did discuss at length the deleterious effects of elevated blood pressure on his overall wellbeing including developing risks of cardiac issues, stroke  - Recommended maintaining a ambulatory blood pressure log and bring it to his next appointment.  Also recommended DASH diet   - Recommended a healthy exercise regimen including 30 minutes of walking.     Orders:    lisinopril-hydroCHLOROthiazide (PRINZIDE;ZESTORETIC) 20-12.5 MG per tablet; Take 1 tablet by mouth daily    Comprehensive Metabolic Panel; Future    Encounter to establish care with new doctor     - Discussed all age-appropriate vaccinations and screening tests recommended      Nasal congestion     Appears to be a chronic issue, worse when he wakes up first in the morning.  Recommended doing a trial of scheduled Zyrtec daily and Flonase at nighttime before bed.  Will continue to monitor  Orders:    cetirizine (ZYRTEC) 10 MG tablet; Take 1 tablet by mouth daily    fluticasone (FLONASE) 50 MCG/ACT nasal spray; 2 sprays by Each Nostril route daily    Class 3 severe obesity without serious comorbidity with body mass index (BMI) of 40.0 to 44.9 in adult, unspecified obesity type (HCC)   Uncontrolled, continue current medications and lifestyle modifications recommended  Calorie requirement 2500; recommended 2200  -  Discussed at length the risks of obesity on his overall health Including heart disease, diabetes, high blood pressure, high cholesterol, liver disease, osteoarthritis, sleep apnea, certain cancers, stroke, low quality of life and risks of clinical depression, anxiety and other mental disorders.  - Discussed st length setting a

## 2024-09-13 ENCOUNTER — OFFICE VISIT (OUTPATIENT)
Dept: FAMILY MEDICINE CLINIC | Facility: CLINIC | Age: 49
End: 2024-09-13
Payer: COMMERCIAL

## 2024-09-13 VITALS
HEART RATE: 88 BPM | TEMPERATURE: 97.6 F | RESPIRATION RATE: 16 BRPM | HEIGHT: 71 IN | OXYGEN SATURATION: 97 % | DIASTOLIC BLOOD PRESSURE: 88 MMHG | BODY MASS INDEX: 42.42 KG/M2 | SYSTOLIC BLOOD PRESSURE: 138 MMHG | WEIGHT: 303 LBS

## 2024-09-13 DIAGNOSIS — R68.82 LOW LIBIDO: ICD-10-CM

## 2024-09-13 DIAGNOSIS — I10 ESSENTIAL HYPERTENSION: ICD-10-CM

## 2024-09-13 DIAGNOSIS — R09.81 NASAL CONGESTION: ICD-10-CM

## 2024-09-13 DIAGNOSIS — R73.09 ELEVATED RANDOM BLOOD GLUCOSE LEVEL: Primary | ICD-10-CM

## 2024-09-13 PROCEDURE — 3079F DIAST BP 80-89 MM HG: CPT | Performed by: FAMILY MEDICINE

## 2024-09-13 PROCEDURE — 3075F SYST BP GE 130 - 139MM HG: CPT | Performed by: FAMILY MEDICINE

## 2024-09-13 PROCEDURE — G8417 CALC BMI ABV UP PARAM F/U: HCPCS | Performed by: FAMILY MEDICINE

## 2024-09-13 PROCEDURE — 4004F PT TOBACCO SCREEN RCVD TLK: CPT | Performed by: FAMILY MEDICINE

## 2024-09-13 PROCEDURE — 99214 OFFICE O/P EST MOD 30 MIN: CPT | Performed by: FAMILY MEDICINE

## 2024-09-13 PROCEDURE — G8427 DOCREV CUR MEDS BY ELIG CLIN: HCPCS | Performed by: FAMILY MEDICINE

## 2024-09-13 RX ORDER — HYDROCHLOROTHIAZIDE 25 MG/1
12.5 TABLET ORAL EVERY MORNING
Qty: 8 TABLET | Refills: 0 | Status: SHIPPED | OUTPATIENT
Start: 2024-09-13 | End: 2024-09-29

## 2024-09-13 ASSESSMENT — PATIENT HEALTH QUESTIONNAIRE - PHQ9
SUM OF ALL RESPONSES TO PHQ QUESTIONS 1-9: 8
SUM OF ALL RESPONSES TO PHQ QUESTIONS 1-9: 8
SUM OF ALL RESPONSES TO PHQ9 QUESTIONS 1 & 2: 4
SUM OF ALL RESPONSES TO PHQ QUESTIONS 1-9: 8
4. FEELING TIRED OR HAVING LITTLE ENERGY: NEARLY EVERY DAY
10. IF YOU CHECKED OFF ANY PROBLEMS, HOW DIFFICULT HAVE THESE PROBLEMS MADE IT FOR YOU TO DO YOUR WORK, TAKE CARE OF THINGS AT HOME, OR GET ALONG WITH OTHER PEOPLE: SOMEWHAT DIFFICULT
5. POOR APPETITE OR OVEREATING: NOT AT ALL
2. FEELING DOWN, DEPRESSED OR HOPELESS: SEVERAL DAYS
7. TROUBLE CONCENTRATING ON THINGS, SUCH AS READING THE NEWSPAPER OR WATCHING TELEVISION: NOT AT ALL
8. MOVING OR SPEAKING SO SLOWLY THAT OTHER PEOPLE COULD HAVE NOTICED. OR THE OPPOSITE, BEING SO FIGETY OR RESTLESS THAT YOU HAVE BEEN MOVING AROUND A LOT MORE THAN USUAL: NOT AT ALL
9. THOUGHTS THAT YOU WOULD BE BETTER OFF DEAD, OR OF HURTING YOURSELF: NOT AT ALL
SUM OF ALL RESPONSES TO PHQ QUESTIONS 1-9: 8
6. FEELING BAD ABOUT YOURSELF - OR THAT YOU ARE A FAILURE OR HAVE LET YOURSELF OR YOUR FAMILY DOWN: SEVERAL DAYS
1. LITTLE INTEREST OR PLEASURE IN DOING THINGS: NEARLY EVERY DAY
3. TROUBLE FALLING OR STAYING ASLEEP: NOT AT ALL

## 2025-08-08 ENCOUNTER — COMMUNITY OUTREACH (OUTPATIENT)
Dept: FAMILY MEDICINE CLINIC | Facility: CLINIC | Age: 50
End: 2025-08-08

## (undated) DEVICE — AMD ANTIMICROBIAL SUPER SPONGES,MEDIUM: Brand: KERLIX

## (undated) DEVICE — BUTTON SWITCH PENCIL BLADE ELECTRODE, HOLSTER: Brand: EDGE

## (undated) DEVICE — (D)PREP SKN CHLRAPRP APPL 26ML -- CONVERT TO ITEM 371833

## (undated) DEVICE — TETRA-FLEX CF WOVEN LATEX FREE ELASTIC BANDAGE 6" X 11 YD: Brand: TETRA-FLEX™CF

## (undated) DEVICE — DELIVERY NEEDLE 12GA X 10CM CURVED-STERILE

## (undated) DEVICE — 2.5MM DRILL BIT/QC/GOLD/110MM

## (undated) DEVICE — SPLINT MAT XF SPEC 5X30IN --

## (undated) DEVICE — SOLUTION IV 1000ML 0.9% SOD CHL

## (undated) DEVICE — DRAPE SHT 3 QTR PROXIMA 53X77 --

## (undated) DEVICE — OCCLUSIVE GAUZE STRIP,3% BISMUTH TRIBROMOPHENATE IN PETROLATUM BLEND: Brand: XEROFORM

## (undated) DEVICE — SCREW BNE L38MM DIA3.5MM CORT S STL ST NONCANNULATED LOK
Type: IMPLANTABLE DEVICE | Site: TIBIA | Status: NON-FUNCTIONAL
Removed: 2018-08-15

## (undated) DEVICE — SUTURE MCRYL SZ 2-0 L36IN ABSRB UD L36MM CT-1 1/2 CIR Y945H

## (undated) DEVICE — BIT DRL L200MM DIA2.8MM CALIB L100MM FOR 3.5MM VA LCP PROX

## (undated) DEVICE — NEEDLE DEL 12GA L10CM BNE CEM INJ NORIAN

## (undated) DEVICE — DISPOSABLE TOURNIQUET CUFF SINGLE BLADDER, DUAL PORT AND QUICK CONNECT CONNECTOR: Brand: COLOR CUFF

## (undated) DEVICE — 2000CC GUARDIAN II: Brand: GUARDIAN

## (undated) DEVICE — IMPLANTABLE DEVICE
Type: IMPLANTABLE DEVICE | Site: TIBIA | Status: NON-FUNCTIONAL
Removed: 2018-08-15

## (undated) DEVICE — DRAPE,U/SHT,SPLIT,FILM,60X84,STERILE: Brand: MEDLINE

## (undated) DEVICE — SUTURE MCRYL SZ 0 L36IN ABSRB UD L36MM CT-1 1/2 CIR Y946H

## (undated) DEVICE — REM POLYHESIVE ADULT PATIENT RETURN ELECTRODE: Brand: VALLEYLAB

## (undated) DEVICE — X-LARGE COTTON GLOVE: Brand: DEROYAL

## (undated) DEVICE — Device

## (undated) DEVICE — BIT DRL DIA2.8MM SHT CALIB QUIK CPL W/ STP PRO-PAK